# Patient Record
Sex: MALE | Race: WHITE | NOT HISPANIC OR LATINO | Employment: OTHER | URBAN - NONMETROPOLITAN AREA
[De-identification: names, ages, dates, MRNs, and addresses within clinical notes are randomized per-mention and may not be internally consistent; named-entity substitution may affect disease eponyms.]

---

## 2022-11-10 ENCOUNTER — TELEPHONE (OUTPATIENT)
Dept: PREADMISSION TESTING | Facility: HOSPITAL | Age: 67
End: 2022-11-10

## 2022-11-11 ENCOUNTER — PRE-ADMISSION TESTING (OUTPATIENT)
Dept: PREADMISSION TESTING | Facility: HOSPITAL | Age: 67
End: 2022-11-11

## 2022-11-11 VITALS — WEIGHT: 207.4 LBS | BODY MASS INDEX: 30.72 KG/M2 | HEIGHT: 69 IN

## 2022-11-11 LAB
ANION GAP SERPL CALCULATED.3IONS-SCNC: 8.7 MMOL/L (ref 5–15)
BUN SERPL-MCNC: 14 MG/DL (ref 8–23)
BUN/CREAT SERPL: 16.7 (ref 7–25)
CALCIUM SPEC-SCNC: 9.1 MG/DL (ref 8.6–10.5)
CHLORIDE SERPL-SCNC: 97 MMOL/L (ref 98–107)
CO2 SERPL-SCNC: 30.3 MMOL/L (ref 22–29)
CREAT SERPL-MCNC: 0.84 MG/DL (ref 0.76–1.27)
DEPRECATED RDW RBC AUTO: 45.8 FL (ref 37–54)
EGFRCR SERPLBLD CKD-EPI 2021: 94.4 ML/MIN/1.73
ERYTHROCYTE [DISTWIDTH] IN BLOOD BY AUTOMATED COUNT: 13.4 % (ref 12.3–15.4)
GLUCOSE SERPL-MCNC: 132 MG/DL (ref 65–99)
HCT VFR BLD AUTO: 43.3 % (ref 37.5–51)
HGB BLD-MCNC: 15.2 G/DL (ref 13–17.7)
MCH RBC QN AUTO: 33 PG (ref 26.6–33)
MCHC RBC AUTO-ENTMCNC: 35.1 G/DL (ref 31.5–35.7)
MCV RBC AUTO: 93.9 FL (ref 79–97)
PLATELET # BLD AUTO: 347 10*3/MM3 (ref 140–450)
PMV BLD AUTO: 9.3 FL (ref 6–12)
POTASSIUM SERPL-SCNC: 4.2 MMOL/L (ref 3.5–5.2)
QT INTERVAL: 402 MS
QTC INTERVAL: 427 MS
RBC # BLD AUTO: 4.61 10*6/MM3 (ref 4.14–5.8)
SODIUM SERPL-SCNC: 136 MMOL/L (ref 136–145)
WBC NRBC COR # BLD: 7.99 10*3/MM3 (ref 3.4–10.8)

## 2022-11-11 PROCEDURE — 80048 BASIC METABOLIC PNL TOTAL CA: CPT

## 2022-11-11 PROCEDURE — 36415 COLL VENOUS BLD VENIPUNCTURE: CPT

## 2022-11-11 PROCEDURE — 93010 ELECTROCARDIOGRAM REPORT: CPT | Performed by: INTERNAL MEDICINE

## 2022-11-11 PROCEDURE — 85027 COMPLETE CBC AUTOMATED: CPT

## 2022-11-11 PROCEDURE — 93005 ELECTROCARDIOGRAM TRACING: CPT

## 2022-11-11 RX ORDER — AMLODIPINE BESYLATE 10 MG/1
10 TABLET ORAL DAILY
COMMUNITY

## 2022-11-11 RX ORDER — SIMVASTATIN 40 MG
40 TABLET ORAL NIGHTLY
COMMUNITY

## 2022-11-11 RX ORDER — IBUPROFEN 200 MG
400 TABLET ORAL EVERY 6 HOURS PRN
COMMUNITY

## 2022-11-11 RX ORDER — CALCIUM SENNOSIDES 25 MG/1
TABLET ORAL DAILY
COMMUNITY

## 2022-11-11 RX ORDER — AMLODIPINE BESYLATE 2.5 MG/1
2.5 TABLET ORAL DAILY
COMMUNITY

## 2022-11-11 RX ORDER — MULTIPLE VITAMINS W/ MINERALS TAB 9MG-400MCG
1 TAB ORAL DAILY
COMMUNITY

## 2022-11-11 RX ORDER — DOCUSATE SODIUM 100 MG/1
300 CAPSULE, LIQUID FILLED ORAL DAILY
COMMUNITY

## 2022-11-11 RX ORDER — DULOXETIN HYDROCHLORIDE 60 MG/1
60 CAPSULE, DELAYED RELEASE ORAL DAILY
COMMUNITY

## 2022-11-11 RX ORDER — OXYCODONE AND ACETAMINOPHEN 7.5; 325 MG/1; MG/1
1 TABLET ORAL EVERY 6 HOURS PRN
COMMUNITY

## 2022-11-11 RX ORDER — LORATADINE 10 MG/1
10 TABLET ORAL DAILY
COMMUNITY

## 2022-11-11 RX ORDER — CHLORTHALIDONE 25 MG/1
25 TABLET ORAL DAILY
COMMUNITY

## 2022-11-11 RX ORDER — CYCLOBENZAPRINE HCL 10 MG
10 TABLET ORAL 2 TIMES DAILY PRN
COMMUNITY

## 2022-11-11 RX ORDER — DOXEPIN HYDROCHLORIDE 25 MG/1
25 CAPSULE ORAL NIGHTLY
COMMUNITY

## 2022-11-11 RX ORDER — LISINOPRIL 40 MG/1
40 TABLET ORAL DAILY
COMMUNITY

## 2022-11-11 NOTE — DISCHARGE INSTRUCTIONS
PAT PASS GIVEN/REVIEWED WITH PT.  VERBALIZED UNDERSTANDING OF THE FOLLOWING:  DO NOT EAT, DRINK, SMOKE, USE SMOKELESS TOBACCO OR CHEW GUM AFTER MIDNIGHT THE NIGHT BEFORE SURGERY.  THIS ALSO INCLUDES HARD CANDIES AND MINTS.    DO NOT SHAVE THE AREA TO BE OPERATED ON AT LEAST 48 HOURS PRIOR TO THE PROCEDURE.  DO NOT WEAR MAKE UP OR NAIL POLISH.  DO NOT LEAVE IN ANY PIERCING OR WEAR JEWELRY THE DAY OF SURGERY.      DO NOT USE ADHESIVES IF YOU WEAR DENTURES.    DO NOT WEAR EYE CONTACTS; BRING IN YOUR GLASSES.    ONLY TAKE MEDICATION THE MORNING OF YOUR PROCEDURE IF INSTRUCTED BY YOUR SURGEON WITH ENOUGH WATER TO SWALLOW THE MEDICATION.  IF YOUR SURGEON DID NOT SPECIFY WHICH MEDICATIONS TO TAKE, YOU WILL NEED TO CALL THEIR OFFICE FOR FURTHER INSTRUCTIONS AND DO AS THEY INSTRUCT.    LEAVE ANYTHING YOU CONSIDER VALUABLE AT HOME.    YOU WILL NEED TO ARRANGE FOR SOMEONE TO DRIVE YOU HOME AFTER SURGERY.  IT IS RECOMMENDED THAT YOU DO NOT DRIVE, WORK, DRINK ALCOHOL OR MAKE MAJOR DECISIONS FOR AT LEAST 24 HOURS AFTER YOUR PROCEDURE IS COMPLETE.      THE DAY OF YOUR PROCEDURE, BRING IN THE FOLLOWING IF APPLICABLE:   PICTURE ID AND INSURANCE/MEDICARE OR MEDICAID CARDS/ANY CO-PAY THAT MAY BE DUE   COPY OF ADVANCED DIRECTIVE/LIVING WILL/POWER OR    CPAP/BIPAP/INHALERS   SKIN PREP SHEET   YOUR PREADMISSION TESTING PASS (IF NOT A PHONE HISTORY)      Chlorhexadine wipes along with instruction/verification sheet given to pt.  Instructed pt to date, time, and initial the verification sheet once skin prep has been  completed, and to return to [facility] the day of the procedure.  Pt. Verbalizes understanding.      Introduction to anesthesia video viewed by pt in PAT.

## 2022-11-18 ENCOUNTER — ANESTHESIA EVENT (OUTPATIENT)
Dept: PERIOP | Facility: HOSPITAL | Age: 67
End: 2022-11-18

## 2022-11-18 ENCOUNTER — ANESTHESIA (OUTPATIENT)
Dept: PERIOP | Facility: HOSPITAL | Age: 67
End: 2022-11-18

## 2022-11-18 ENCOUNTER — HOSPITAL ENCOUNTER (OUTPATIENT)
Facility: HOSPITAL | Age: 67
Setting detail: HOSPITAL OUTPATIENT SURGERY
Discharge: HOME | End: 2022-11-18
Attending: ORTHOPAEDIC SURGERY | Admitting: ORTHOPAEDIC SURGERY

## 2022-11-18 VITALS
OXYGEN SATURATION: 93 % | SYSTOLIC BLOOD PRESSURE: 135 MMHG | HEART RATE: 70 BPM | RESPIRATION RATE: 18 BRPM | DIASTOLIC BLOOD PRESSURE: 83 MMHG | TEMPERATURE: 97.7 F

## 2022-11-18 PROCEDURE — 0 CEFAZOLIN SODIUM-DEXTROSE 2-3 GM-%(50ML) RECONSTITUTED SOLUTION: Performed by: ORTHOPAEDIC SURGERY

## 2022-11-18 PROCEDURE — 0 LIDOCAINE 1 % SOLUTION 20 ML VIAL: Performed by: ORTHOPAEDIC SURGERY

## 2022-11-18 PROCEDURE — 25010000002 HYDROMORPHONE PER 4 MG: Performed by: NURSE ANESTHETIST, CERTIFIED REGISTERED

## 2022-11-18 PROCEDURE — 25010000002 DEXAMETHASONE PER 1 MG: Performed by: NURSE ANESTHETIST, CERTIFIED REGISTERED

## 2022-11-18 PROCEDURE — 25010000002 ONDANSETRON PER 1 MG: Performed by: NURSE ANESTHETIST, CERTIFIED REGISTERED

## 2022-11-18 PROCEDURE — 25010000002 PROPOFOL 200 MG/20ML EMULSION: Performed by: NURSE ANESTHETIST, CERTIFIED REGISTERED

## 2022-11-18 DEVICE — DEV CONTRL TISS STRATAFIX SPIRAL MNCRYL UD 3/0 PLS 60CM: Type: IMPLANTABLE DEVICE | Site: HAND | Status: FUNCTIONAL

## 2022-11-18 RX ORDER — OXYCODONE HYDROCHLORIDE AND ACETAMINOPHEN 5; 325 MG/1; MG/1
1 TABLET ORAL EVERY 4 HOURS PRN
Qty: 30 TABLET | Refills: 0 | Status: SHIPPED | OUTPATIENT
Start: 2022-11-18

## 2022-11-18 RX ORDER — PROPOFOL 10 MG/ML
INJECTION, EMULSION INTRAVENOUS AS NEEDED
Status: DISCONTINUED | OUTPATIENT
Start: 2022-11-18 | End: 2022-11-18 | Stop reason: SURG

## 2022-11-18 RX ORDER — LIDOCAINE HYDROCHLORIDE 20 MG/ML
INJECTION, SOLUTION INTRAVENOUS AS NEEDED
Status: DISCONTINUED | OUTPATIENT
Start: 2022-11-18 | End: 2022-11-18 | Stop reason: SURG

## 2022-11-18 RX ORDER — DEXAMETHASONE SODIUM PHOSPHATE 4 MG/ML
INJECTION, SOLUTION INTRA-ARTICULAR; INTRALESIONAL; INTRAMUSCULAR; INTRAVENOUS; SOFT TISSUE AS NEEDED
Status: DISCONTINUED | OUTPATIENT
Start: 2022-11-18 | End: 2022-11-18 | Stop reason: SURG

## 2022-11-18 RX ORDER — ONDANSETRON 2 MG/ML
INJECTION INTRAMUSCULAR; INTRAVENOUS AS NEEDED
Status: DISCONTINUED | OUTPATIENT
Start: 2022-11-18 | End: 2022-11-18 | Stop reason: SURG

## 2022-11-18 RX ORDER — SODIUM CHLORIDE, SODIUM LACTATE, POTASSIUM CHLORIDE, CALCIUM CHLORIDE 600; 310; 30; 20 MG/100ML; MG/100ML; MG/100ML; MG/100ML
1000 INJECTION, SOLUTION INTRAVENOUS CONTINUOUS
Status: DISCONTINUED | OUTPATIENT
Start: 2022-11-18 | End: 2022-11-18 | Stop reason: HOSPADM

## 2022-11-18 RX ORDER — CEFAZOLIN SODIUM 2 G/50ML
2 SOLUTION INTRAVENOUS ONCE
Status: COMPLETED | OUTPATIENT
Start: 2022-11-18 | End: 2022-11-18

## 2022-11-18 RX ORDER — HYDROMORPHONE HCL 110MG/55ML
PATIENT CONTROLLED ANALGESIA SYRINGE INTRAVENOUS AS NEEDED
Status: DISCONTINUED | OUTPATIENT
Start: 2022-11-18 | End: 2022-11-18 | Stop reason: SURG

## 2022-11-18 RX ADMIN — CEFAZOLIN SODIUM 2 G: 2 SOLUTION INTRAVENOUS at 10:33

## 2022-11-18 RX ADMIN — PROPOFOL 200 MG: 10 INJECTION, EMULSION INTRAVENOUS at 10:36

## 2022-11-18 RX ADMIN — HYDROMORPHONE HYDROCHLORIDE 0.5 MG: 2 INJECTION, SOLUTION INTRAMUSCULAR; INTRAVENOUS; SUBCUTANEOUS at 10:43

## 2022-11-18 RX ADMIN — DEXAMETHASONE SODIUM PHOSPHATE 4 MG: 4 INJECTION, SOLUTION INTRAMUSCULAR; INTRAVENOUS at 10:36

## 2022-11-18 RX ADMIN — GLYCOPYRROLATE 0.4 MG: 0.2 INJECTION, SOLUTION INTRAMUSCULAR; INTRAVENOUS at 10:52

## 2022-11-18 RX ADMIN — SODIUM CHLORIDE, POTASSIUM CHLORIDE, SODIUM LACTATE AND CALCIUM CHLORIDE 1000 ML: 600; 310; 30; 20 INJECTION, SOLUTION INTRAVENOUS at 09:27

## 2022-11-18 RX ADMIN — LIDOCAINE HYDROCHLORIDE 40 MG: 20 INJECTION, SOLUTION INTRAVENOUS at 10:36

## 2022-11-18 RX ADMIN — HYDROMORPHONE HYDROCHLORIDE 1 MG: 2 INJECTION, SOLUTION INTRAMUSCULAR; INTRAVENOUS; SUBCUTANEOUS at 10:49

## 2022-11-18 RX ADMIN — ONDANSETRON 4 MG: 2 INJECTION INTRAMUSCULAR; INTRAVENOUS at 10:36

## 2022-11-18 RX ADMIN — HYDROMORPHONE HYDROCHLORIDE 0.5 MG: 2 INJECTION, SOLUTION INTRAMUSCULAR; INTRAVENOUS; SUBCUTANEOUS at 10:46

## 2022-11-18 NOTE — OP NOTE
[address], [address]  [place]  [address] [phone]      OPERATIVE REPORT           PATIENT NAME:   Harley Flynn                            YOB: 1955      PREOP DIAGNOSIS:   Right Carpal Tunnel Syndrome and cubital tunnel syndrome    POSTOP DIAGNOSIS:  Right Carpal Tunnel Syndrome and cubital tunnel syndrome  PROCEDURE:     Right Carpal Tunnel Release and cubital tunnel release    SURGEON:    Laz Cristina MD    OPERATIVE TEAM:   Circulator: Arsen Rodarte RN  Scrub Person: Yoly Tang Misty    ANESTHETIST:   LISS: Tim Ortiz CRNA    ANESTHESIA:   General    ESTIM BLOOD LOSS:  minimal      SPECIMENS:     None.    DRAINS:    None.    COMPLICATIONS:     None.    DISPOSITION:     Stable to recovery.    INDICATIONS AND NARRATIVE:   The patient presents for planned elective carpal tunnel and cubital tunnel release surgery to address the ongoing Right  wrist and hand and elbow condition.   The risks and benefits of the surgery were discussed and an informed consent obtained.  Risks were discussed including, but not limited to anesthesia, infection, nerve/vessel/tendon injury and persistent symptoms or limitations of the wrist and hand.  Goals include pain relief, improved sensation, strength, mobility and potential for improved function of the wrist and hand.      OPERATIVE PROCEDURE:  Antibiotic prophylaxis given.  Surgeon site marking and a time out were performed.  Anesthesia was effective and well tolerated.  The Right arm and hand was prepped and draped in the usual sterile fashion.      After sterile prep and drape and with tourniquet, a longitudinal incision made proximal palm   sharp dissection was carried down through superficial palmar fascia.  This was continued to the transverse carpal ligament.  Complete release was noted distally.   Sharp to dull resection utilized proximally, noting complete release. decompressing the median  nerve and flexor tendons.  These structures could be easily visualized, and were intact within the carpal tunnel.  Median nerve had hour glass shape, no other pathology was noted.  The tourniquet was taken down and there was excellent hemostasis during the procedure.  The wound was irrigated copiously.  Skin closure consisted of interrupted 4-0 nylon  sutures.      a  curved incision was made and centered posterior to the medial epicondlye. Branches of the medial antebrachial cutaneous nerve were identified and preserved during the subcutaneous dissection. The fascia over the flexor carpi ulnaris and Delmi’s ligament were released exposing the ulnar nerve posterior to the medial epicondlye within the cubital tunnel. The nerve was release distally for four centimeters and proximally as well for six to eight centimeters along the medial intermuscular septum and releasing the [place] proximally. The nerve could now stay in a relaxed position in situ throughout full elbow extension, flexion, pronation and supination. It was relaxed without tension through the full range of elbow and arm motion.      The tourniquet was taken down and there was good hemostasis.  Minor bleeding was controlled with electocautery and the wound was irrigated copiously throughout the procedure with antibiotic solution. Skin closure consisted of interrupted 2-0 Vicryl subcutaneous sutures and 3-0 nylon sutures. A sterile dressing and a sling was applied. Anesthesia was effective and well tolerated. There were no complications of the procedure. The patient was transferred in stable condition to the recovery room.      Caleb Cristina MD  11/18/2022

## 2022-11-18 NOTE — ANESTHESIA POSTPROCEDURE EVALUATION
Patient: Harley Flynn    Procedure Summary     Date: 11/18/22 Room / Location: [facility] OR  /  [facility] OR    Anesthesia Start: 1033 Anesthesia Stop: 1116    Procedure: RIGHT CARPAL TUNNEL RELEASE AND RIGHT CUBITAL TUNNEL RELEASE (Right: Wrist) Diagnosis:       Carpal tunnel syndrome, right upper limb      Lesion of ulnar nerve, right upper limb      (Carpal tunnel syndrome, right upper limb [G56.01])      (Lesion of ulnar nerve, right upper limb [G56.21])    Surgeons: Caleb Cristina MD Provider: Tim Ortiz CRNA    Anesthesia Type: MAC ASA Status: 3          Anesthesia Type: MAC    Vitals  HR 62  Sat 98  Resp 12  Temp 98.3  /67        Post Anesthesia Care and Evaluation    Patient location during evaluation: PACU  Patient participation: complete - patient participated  Level of consciousness: awake and alert  Pain management: satisfactory to patient    Airway patency: patent  Anesthetic complications: No anesthetic complications    Cardiovascular status: acceptable and stable  Respiratory status: acceptable and face mask  Hydration status: acceptable

## 2022-11-18 NOTE — ANESTHESIA PROCEDURE NOTES
Airway  Urgency: elective    Date/Time: 11/18/2022 10:37 AM  Airway not difficult    General Information and Staff    Patient location during procedure: OR  CRNA/CAA: Tim Ortiz, ILSS    Indications and Patient Condition  Indications for airway management: airway protection    Preoxygenated: yes  Mask difficulty assessment: 1 - vent by mask    Final Airway Details  Final airway type: supraglottic airway      Successful airway: classic  Size 4     Number of attempts at approach: 1  Assessment: lips, teeth, and gum same as pre-op and atraumatic intubation    Additional Comments  Cuff pressure/leak less than 74mgE16

## 2022-11-18 NOTE — ANESTHESIA PREPROCEDURE EVALUATION
Anesthesia Evaluation     Patient summary reviewed and Nursing notes reviewed   history of anesthetic complications: PONV  NPO Solid Status: > 8 hours  NPO Liquid Status: > 8 hours           Airway   Mallampati: II  TM distance: >3 FB  Neck ROM: full  Possible difficult intubation  Dental - normal exam     Pulmonary - normal exam   (+) sleep apnea,   Cardiovascular - normal exam  Exercise tolerance: good (4-7 METS)    ECG reviewed    (+) hypertension 2 medications or greater, hyperlipidemia,     ROS comment: Sinus rhythm with premature atrial complexes  Left axis deviation  Left ventricular hypertrophy with QRS widening  Abnormal ECG  When compared with ECG of 11-NOV-2022 11:17, (Unconfirmed)  No significant change was found  Confirmed by IKER HDEZ ([id number]) on 11/11/2022 1:32:55 PM    Neuro/Psych  (+) psychiatric history,    GI/Hepatic/Renal/Endo    (+) obesity,       Musculoskeletal     Abdominal    Substance History - negative use     OB/GYN negative ob/gyn ROS         Other   arthritis,                      Anesthesia Plan    ASA 3     MAC     (Risks and benefits discussed including risk of aspiration, recall and dental damage. All patient questions answered.    Will continue with plan of care.)  intravenous induction     Anesthetic plan, risks, benefits, and alternatives have been provided, discussed and informed consent has been obtained with: patient.  Pre-procedure education provided  Plan discussed with CRNA.        CODE STATUS:

## 2023-01-07 ENCOUNTER — APPOINTMENT (OUTPATIENT)
Dept: CT IMAGING | Facility: HOSPITAL | Age: 68
End: 2023-01-07
Payer: MEDICARE

## 2023-01-07 ENCOUNTER — APPOINTMENT (OUTPATIENT)
Dept: GENERAL RADIOLOGY | Facility: HOSPITAL | Age: 68
End: 2023-01-07
Payer: MEDICARE

## 2023-01-07 ENCOUNTER — HOSPITAL ENCOUNTER (EMERGENCY)
Facility: HOSPITAL | Age: 68
Discharge: HOME | End: 2023-01-07
Attending: EMERGENCY MEDICINE | Admitting: EMERGENCY MEDICINE
Payer: MEDICARE

## 2023-01-07 VITALS
HEIGHT: 68 IN | OXYGEN SATURATION: 96 % | TEMPERATURE: 98.3 F | WEIGHT: 217 LBS | DIASTOLIC BLOOD PRESSURE: 95 MMHG | HEART RATE: 69 BPM | BODY MASS INDEX: 32.89 KG/M2 | SYSTOLIC BLOOD PRESSURE: 135 MMHG | RESPIRATION RATE: 20 BRPM

## 2023-01-07 DIAGNOSIS — R06.02 SHORTNESS OF BREATH: Primary | ICD-10-CM

## 2023-01-07 LAB
ALBUMIN SERPL-MCNC: 4.4 G/DL (ref 3.5–5.2)
ALBUMIN/GLOB SERPL: 1.3 G/DL
ALP SERPL-CCNC: 102 U/L (ref 39–117)
ALT SERPL W P-5'-P-CCNC: 13 U/L (ref 1–41)
ANION GAP SERPL CALCULATED.3IONS-SCNC: 12.2 MMOL/L (ref 5–15)
AST SERPL-CCNC: 18 U/L (ref 1–40)
BASOPHILS # BLD AUTO: 0.03 10*3/MM3 (ref 0–0.2)
BASOPHILS NFR BLD AUTO: 0.3 % (ref 0–1.5)
BILIRUB SERPL-MCNC: 0.5 MG/DL (ref 0–1.2)
BUN SERPL-MCNC: 14 MG/DL (ref 8–23)
BUN/CREAT SERPL: 17.3 (ref 7–25)
CALCIUM SPEC-SCNC: 9.8 MG/DL (ref 8.6–10.5)
CHLORIDE SERPL-SCNC: 92 MMOL/L (ref 98–107)
CO2 SERPL-SCNC: 30.8 MMOL/L (ref 22–29)
CREAT SERPL-MCNC: 0.81 MG/DL (ref 0.76–1.27)
DEPRECATED RDW RBC AUTO: 43.8 FL (ref 37–54)
EGFRCR SERPLBLD CKD-EPI 2021: 95.4 ML/MIN/1.73
EOSINOPHIL # BLD AUTO: 0.01 10*3/MM3 (ref 0–0.4)
EOSINOPHIL NFR BLD AUTO: 0.1 % (ref 0.3–6.2)
ERYTHROCYTE [DISTWIDTH] IN BLOOD BY AUTOMATED COUNT: 12.8 % (ref 12.3–15.4)
FLUAV RNA RESP QL NAA+PROBE: NOT DETECTED
FLUBV RNA RESP QL NAA+PROBE: NOT DETECTED
GLOBULIN UR ELPH-MCNC: 3.4 GM/DL
GLUCOSE SERPL-MCNC: 142 MG/DL (ref 65–99)
HCT VFR BLD AUTO: 43.5 % (ref 37.5–51)
HGB BLD-MCNC: 15 G/DL (ref 13–17.7)
HOLD SPECIMEN: NORMAL
HOLD SPECIMEN: NORMAL
IMM GRANULOCYTES # BLD AUTO: 0.03 10*3/MM3 (ref 0–0.05)
IMM GRANULOCYTES NFR BLD AUTO: 0.3 % (ref 0–0.5)
LYMPHOCYTES # BLD AUTO: 2.36 10*3/MM3 (ref 0.7–3.1)
LYMPHOCYTES NFR BLD AUTO: 23 % (ref 19.6–45.3)
MCH RBC QN AUTO: 32 PG (ref 26.6–33)
MCHC RBC AUTO-ENTMCNC: 34.5 G/DL (ref 31.5–35.7)
MCV RBC AUTO: 92.8 FL (ref 79–97)
MONOCYTES # BLD AUTO: 0.8 10*3/MM3 (ref 0.1–0.9)
MONOCYTES NFR BLD AUTO: 7.8 % (ref 5–12)
NEUTROPHILS NFR BLD AUTO: 68.5 % (ref 42.7–76)
NEUTROPHILS NFR BLD AUTO: 7.05 10*3/MM3 (ref 1.7–7)
NRBC BLD AUTO-RTO: 0 /100 WBC (ref 0–0.2)
NT-PROBNP SERPL-MCNC: 89.7 PG/ML (ref 0–900)
PLATELET # BLD AUTO: 508 10*3/MM3 (ref 140–450)
PMV BLD AUTO: 8.7 FL (ref 6–12)
POTASSIUM SERPL-SCNC: 3.3 MMOL/L (ref 3.5–5.2)
PROT SERPL-MCNC: 7.8 G/DL (ref 6–8.5)
RBC # BLD AUTO: 4.69 10*6/MM3 (ref 4.14–5.8)
SARS-COV-2 RNA RESP QL NAA+PROBE: NOT DETECTED
SODIUM SERPL-SCNC: 135 MMOL/L (ref 136–145)
TROPONIN T SERPL-MCNC: <0.01 NG/ML (ref 0–0.03)
WBC NRBC COR # BLD: 10.28 10*3/MM3 (ref 3.4–10.8)
WHOLE BLOOD HOLD COAG: NORMAL
WHOLE BLOOD HOLD SPECIMEN: NORMAL

## 2023-01-07 PROCEDURE — 87636 SARSCOV2 & INF A&B AMP PRB: CPT | Performed by: EMERGENCY MEDICINE

## 2023-01-07 PROCEDURE — 25010000002 IOPAMIDOL 61 % SOLUTION: Performed by: EMERGENCY MEDICINE

## 2023-01-07 PROCEDURE — 84484 ASSAY OF TROPONIN QUANT: CPT | Performed by: EMERGENCY MEDICINE

## 2023-01-07 PROCEDURE — 71045 X-RAY EXAM CHEST 1 VIEW: CPT

## 2023-01-07 PROCEDURE — 83880 ASSAY OF NATRIURETIC PEPTIDE: CPT | Performed by: EMERGENCY MEDICINE

## 2023-01-07 PROCEDURE — 94799 UNLISTED PULMONARY SVC/PX: CPT

## 2023-01-07 PROCEDURE — 93005 ELECTROCARDIOGRAM TRACING: CPT | Performed by: EMERGENCY MEDICINE

## 2023-01-07 PROCEDURE — 85025 COMPLETE CBC W/AUTO DIFF WBC: CPT | Performed by: EMERGENCY MEDICINE

## 2023-01-07 PROCEDURE — 80053 COMPREHEN METABOLIC PANEL: CPT | Performed by: EMERGENCY MEDICINE

## 2023-01-07 PROCEDURE — 99284 EMERGENCY DEPT VISIT MOD MDM: CPT

## 2023-01-07 PROCEDURE — 36415 COLL VENOUS BLD VENIPUNCTURE: CPT

## 2023-01-07 PROCEDURE — 94640 AIRWAY INHALATION TREATMENT: CPT

## 2023-01-07 PROCEDURE — 93005 ELECTROCARDIOGRAM TRACING: CPT

## 2023-01-07 PROCEDURE — 71275 CT ANGIOGRAPHY CHEST: CPT

## 2023-01-07 RX ORDER — ACETAMINOPHEN 500 MG
1000 TABLET ORAL ONCE
Status: COMPLETED | OUTPATIENT
Start: 2023-01-07 | End: 2023-01-07

## 2023-01-07 RX ORDER — SODIUM CHLORIDE 0.9 % (FLUSH) 0.9 %
10 SYRINGE (ML) INJECTION AS NEEDED
Status: DISCONTINUED | OUTPATIENT
Start: 2023-01-07 | End: 2023-01-07 | Stop reason: HOSPADM

## 2023-01-07 RX ORDER — IPRATROPIUM BROMIDE AND ALBUTEROL SULFATE 2.5; .5 MG/3ML; MG/3ML
3 SOLUTION RESPIRATORY (INHALATION) ONCE
Status: COMPLETED | OUTPATIENT
Start: 2023-01-07 | End: 2023-01-07

## 2023-01-07 RX ADMIN — IOPAMIDOL 100 ML: 612 INJECTION, SOLUTION INTRAVENOUS at 05:40

## 2023-01-07 RX ADMIN — ACETAMINOPHEN 1000 MG: 500 TABLET ORAL at 07:59

## 2023-01-07 RX ADMIN — IPRATROPIUM BROMIDE AND ALBUTEROL SULFATE 3 ML: .5; 3 SOLUTION RESPIRATORY (INHALATION) at 05:22

## 2023-01-07 NOTE — ED PROVIDER NOTES
TRIAGE CHIEF COMPLAINT:     Nursing and triage notes reviewed    Chief Complaint   Patient presents with   • Shortness of Breath      HPI: Harley Flynn is a 67 y.o. male who presents to the emergency department complaining of shortness of breath.  Patient states he felt like he could not breathe when he lay down flat.  He states it made to have difficulty sleeping.  He said some cough.  He has had nasal congestion and a headache recently as well.  He states symptoms worsened tonight but have been present the past few days.  Denies chest pain.  Denies abdominal pain, nausea, vomiting.    REVIEW OF SYSTEMS: All other systems reviewed and are negative     PAST MEDICAL HISTORY:   Past Medical History:   Diagnosis Date   • Anxiety and depression    • Arthritis    • Chronic pain     states needs a new right knee replacement (replaced in )   • Constipation    • Elevated cholesterol    • History of nuclear stress test 2016    pt states was WNL   • Hypertension    • PONV (postoperative nausea and vomiting)    • Seasonal allergies    • Sleep apnea    • Wears glasses         FAMILY HISTORY:   History reviewed. No pertinent family history.     SOCIAL HISTORY:   Social History     Socioeconomic History   • Marital status:    Tobacco Use   • Smoking status: Former     Packs/day: 2.00     Types: Cigarettes     Quit date:      Years since quittin.0   • Smokeless tobacco: Never   • Tobacco comments:     Smoked since age 11   Vaping Use   • Vaping Use: Never used   Substance and Sexual Activity   • Alcohol use: Not Currently   • Drug use: Never   • Sexual activity: Defer        SURGICAL HISTORY:   Past Surgical History:   Procedure Laterality Date   • APPENDECTOMY     • CARPAL TUNNEL RELEASE Right 2022    Procedure: RIGHT CARPAL TUNNEL RELEASE AND RIGHT CUBITAL TUNNEL RELEASE;  Surgeon: Caleb Cristina MD;  Location: [facility];  Service: Orthopedics;  Laterality: Right;   • COLONOSCOPY     • REPLACEMENT  TOTAL KNEE Right    • WISDOM TOOTH EXTRACTION          CURRENT MEDICATIONS:      Medication List      ASK your doctor about these medications    * amLODIPine 10 MG tablet  Commonly known as: NORVASC     * amLODIPine 2.5 MG tablet  Commonly known as: NORVASC     chlorthalidone 25 MG tablet  Commonly known as: HYGROTON     cyclobenzaprine 10 MG tablet  Commonly known as: FLEXERIL     DEPO-TESTOSTERONE IM     diclofenac 50 MG EC tablet  Commonly known as: VOLTAREN     docusate sodium 100 MG capsule  Commonly known as: COLACE     doxepin 25 MG capsule  Commonly known as: SINEquan     DULoxetine 60 MG capsule  Commonly known as: CYMBALTA     fluticasone 27.5 MCG/SPRAY nasal spray  Commonly known as: VERAMYST     ibuprofen 200 MG tablet  Commonly known as: ADVIL,MOTRIN     Laxative 25 MG tablet  Generic drug: Sennosides     lisinopril 40 MG tablet  Commonly known as: PRINIVIL,ZESTRIL     loratadine 10 MG tablet  Commonly known as: CLARITIN     Melatonin 2.5 MG chewable tablet     MORPHINE SULFATE ER PO     * multivitamin with minerals tablet tablet     * EYE VITAMINS PO     * oxyCODONE-acetaminophen 7.5-325 MG per tablet  Commonly known as: PERCOCET     * oxyCODONE-acetaminophen 5-325 MG per tablet  Commonly known as: PERCOCET  Take 1 tablet by mouth Every 4 (Four) Hours As Needed for Pain     simvastatin 40 MG tablet  Commonly known as: ZOCOR         * This list has 6 medication(s) that are the same as other medications prescribed for you. Read the directions carefully, and ask your doctor or other care provider to review them with you.                 ALLERGIES: Patient has no known allergies.     PHYSICAL EXAM:   VITAL SIGNS:   Vitals:    01/07/23 0415   BP: 156/94   Pulse: 77   Resp: 18   Temp: 98.3 °F (36.8 °C)   SpO2: 94%      CONSTITUTIONAL: Awake, oriented, appears nontoxic   HENT: Atraumatic, normocephalic, oral mucosa pink and moist, airway patent. Nares patent without drainage. External ears normal.   EYES:  Conjunctivae clear   NECK: Trachea midline   CARDIOVASCULAR: Normal heart rate, Normal rhythm, No murmurs, rubs, gallops   PULMONARY/CHEST: Clear to auscultation, no rhonchi, wheezes, or rales. Symmetrical breath sounds.  ABDOMINAL: Nondistended, soft, nontender - no rebound or guarding.  NEUROLOGIC: Nonfocal, moving all four extremities, no gross sensory or motor deficits.   EXTREMITIES: No clubbing, cyanosis, or edema   SKIN: Warm, Dry, No erythema, No rash     ED COURSE / MEDICAL DECISION MAKING:   Harley Flynn is a 67 y.o. male who presents to the emergency department for evaluation of shortness of breath.  Patient is nondistressed on arrival in the emergency department.  Patient is breathing comfortably on room air on arrival in the emergency department.  Vital signs are stable.  Exam is largely unremarkable.    Differential diagnosis includes viral illness, ACS, pulmonary edema, COPD among other etiologies.    Chest x-ray, EKG, CBC, CMP, cardiac enzymes, BNP was ordered for further evaluation of the patient's presentation.    Chart review including any outside testing was reviewed including previous EKG.    Patient was treated with a breathing treatment on arrival.    EKG interpreted by me reveals sinus rhythm with rate of 75 bpm.  There is sinus arrhythmia.  There is a left anterior fascicular block.  This is an abnormal appearing EKG.    Work appears overall reassuring.  Patient resting comfortably.    Plan for disposition is home with close outpatient follow-up.    DECISION TO DISCHARGE/ADMIT: see ED care timeline     FINAL IMPRESSION:   1 --shortness of breath  2 --   3 --     Electronically signed by: Alison Cardona MD, 1/7/2023 05:05 Arsen Urias MD  01/07/23 0819

## 2023-05-12 ENCOUNTER — OFFICE VISIT (OUTPATIENT)
Dept: INTERNAL MEDICINE | Facility: CLINIC | Age: 68
End: 2023-05-12
Payer: MEDICARE

## 2023-05-12 VITALS
DIASTOLIC BLOOD PRESSURE: 64 MMHG | WEIGHT: 215.75 LBS | TEMPERATURE: 98.2 F | BODY MASS INDEX: 30.89 KG/M2 | SYSTOLIC BLOOD PRESSURE: 110 MMHG | HEART RATE: 57 BPM | HEIGHT: 70 IN | OXYGEN SATURATION: 98 %

## 2023-05-12 DIAGNOSIS — J30.89 ENVIRONMENTAL AND SEASONAL ALLERGIES: ICD-10-CM

## 2023-05-12 DIAGNOSIS — R79.89 LOW TESTOSTERONE: ICD-10-CM

## 2023-05-12 DIAGNOSIS — F32.A ANXIETY AND DEPRESSION: ICD-10-CM

## 2023-05-12 DIAGNOSIS — I69.320 APHASIA AS LATE EFFECT OF CEREBROVASCULAR ACCIDENT (CVA): ICD-10-CM

## 2023-05-12 DIAGNOSIS — F51.01 PRIMARY INSOMNIA: ICD-10-CM

## 2023-05-12 DIAGNOSIS — R73.9 HYPERGLYCEMIA: ICD-10-CM

## 2023-05-12 DIAGNOSIS — I48.0 PAROXYSMAL A-FIB: ICD-10-CM

## 2023-05-12 DIAGNOSIS — G47.33 OSA (OBSTRUCTIVE SLEEP APNEA): ICD-10-CM

## 2023-05-12 DIAGNOSIS — I63.9 CEREBROVASCULAR ACCIDENT (CVA), UNSPECIFIED MECHANISM: ICD-10-CM

## 2023-05-12 DIAGNOSIS — E78.5 HYPERLIPIDEMIA, UNSPECIFIED HYPERLIPIDEMIA TYPE: ICD-10-CM

## 2023-05-12 DIAGNOSIS — F41.9 ANXIETY AND DEPRESSION: ICD-10-CM

## 2023-05-12 DIAGNOSIS — I10 PRIMARY HYPERTENSION: ICD-10-CM

## 2023-05-12 DIAGNOSIS — Z76.89 ENCOUNTER TO ESTABLISH CARE: Primary | ICD-10-CM

## 2023-05-12 RX ORDER — GABAPENTIN 100 MG/1
CAPSULE ORAL
COMMUNITY
Start: 2023-04-12 | End: 2023-05-12

## 2023-05-12 RX ORDER — IBUPROFEN 200 MG
200 TABLET ORAL EVERY 6 HOURS PRN
COMMUNITY

## 2023-05-12 RX ORDER — BROMPHENIRAMINE MALEATE, PSEUDOEPHEDRINE HYDROCHLORIDE, AND DEXTROMETHORPHAN HYDROBROMIDE 2; 30; 10 MG/5ML; MG/5ML; MG/5ML
SYRUP ORAL
COMMUNITY
Start: 2023-02-09 | End: 2023-05-12

## 2023-05-12 RX ORDER — HYDROXYZINE PAMOATE 50 MG/1
CAPSULE ORAL
COMMUNITY
Start: 2023-04-04 | End: 2023-05-12

## 2023-05-12 RX ORDER — FLUTICASONE PROPIONATE 50 MCG
SPRAY, SUSPENSION (ML) NASAL
COMMUNITY
Start: 2023-03-13

## 2023-05-12 RX ORDER — TAMSULOSIN HYDROCHLORIDE 0.4 MG/1
CAPSULE ORAL
COMMUNITY
Start: 2023-04-13 | End: 2023-05-12

## 2023-05-12 RX ORDER — ARIPIPRAZOLE 5 MG/1
TABLET ORAL
COMMUNITY
Start: 2023-05-04

## 2023-05-12 RX ORDER — TRAZODONE HYDROCHLORIDE 100 MG/1
TABLET ORAL
COMMUNITY
Start: 2023-05-02

## 2023-05-12 RX ORDER — ENOXAPARIN SODIUM 100 MG/ML
INJECTION SUBCUTANEOUS
COMMUNITY
Start: 2023-04-19 | End: 2023-05-12

## 2023-05-12 RX ORDER — METOPROLOL SUCCINATE 25 MG/1
TABLET, EXTENDED RELEASE ORAL
COMMUNITY
Start: 2023-01-01

## 2023-05-12 RX ORDER — TESTOSTERONE CYPIONATE 200 MG/ML
INJECTION, SOLUTION INTRAMUSCULAR
COMMUNITY
Start: 2023-02-21

## 2023-05-12 RX ORDER — ONDANSETRON 4 MG/1
TABLET, ORALLY DISINTEGRATING ORAL
COMMUNITY
Start: 2023-04-12 | End: 2023-05-12

## 2023-05-12 RX ORDER — HYDROXYZINE PAMOATE 100 MG/1
CAPSULE ORAL
COMMUNITY
Start: 2023-04-07

## 2023-05-12 RX ORDER — MELOXICAM 15 MG/1
TABLET ORAL
COMMUNITY
Start: 2023-04-12 | End: 2023-05-12

## 2023-05-12 RX ORDER — OMEPRAZOLE 20 MG/1
CAPSULE, DELAYED RELEASE ORAL
COMMUNITY
Start: 2023-04-12 | End: 2023-05-12

## 2023-05-12 RX ORDER — TRAMADOL HYDROCHLORIDE 50 MG/1
TABLET ORAL
COMMUNITY
Start: 2023-04-12 | End: 2023-05-12

## 2023-05-12 RX ORDER — ATORVASTATIN CALCIUM 80 MG/1
80 TABLET, FILM COATED ORAL NIGHTLY
Qty: 90 TABLET | Refills: 1 | Status: SHIPPED | OUTPATIENT
Start: 2023-05-12

## 2023-05-12 RX ORDER — ACETAMINOPHEN 500 MG
TABLET ORAL
COMMUNITY
Start: 2023-04-08

## 2023-05-12 RX ORDER — CEPHALEXIN 500 MG/1
CAPSULE ORAL
COMMUNITY
Start: 2023-04-12 | End: 2023-05-12

## 2023-05-12 RX ORDER — OXYCODONE HYDROCHLORIDE 5 MG/1
TABLET ORAL
COMMUNITY
Start: 2023-04-12 | End: 2023-05-12

## 2023-05-12 RX ORDER — ATORVASTATIN CALCIUM 80 MG/1
TABLET, FILM COATED ORAL
COMMUNITY
Start: 2023-04-19 | End: 2023-05-12 | Stop reason: SDUPTHER

## 2023-05-12 RX ORDER — ASPIRIN 81 MG/1
81 TABLET ORAL DAILY
COMMUNITY

## 2023-05-12 NOTE — PROGRESS NOTES
Chief Complaint   Patient presents with   • Establish Care     Patient here as new patient today     Subjective       HPI:  Harley Flynn presents to [facility] care.  Hospitalized for a cerebrovascular accident 1 month ago at [facility]. He is following up with  neurology, and has an appointment on 07/06/2023 with Dr. Garrett Mercer. He has an upcoming transitional care appointment with Chelsi Jarquin PA-C. He has had fatigue and dizziness intermittently. It is most noticeable with position changes. Beta blocker was initiated and his BP is lower than his typical, pulse is 57. He is not drinking enough water. The patient attends physical therapy 3 times weekly and speech therapy since CVA, and progressing well. He has issues processing thoughts and answering questions, but feels that he is improving. He has had visual issues since CVA and wears glasses.    He follows orthopedics, with Dr. Maikel Zuniga. The patient has a history of neuromuscular disorder, RSD, since 1999. He reports that it resolved itself earlier this year, 2023. He used to take opiates, but is no longer. He had carpal tunnel release in 2022 on the right by Dr. Caleb Cristina and knee arthroplasty revision of a 1999 arthroplasty 1 month ago, which has been considered to be the cause of DVT/CVA. . Uses a cane for ambulation.     He has a history of hypertension, atrial fibrillation, and hyperlipidemia. He has an appointment to establish care with cardiology, KAILASH Davila, on 05/31/2023. He was supposed to start Eliquis and has the prescription, but is waiting for the results of a repeat CT scan before starting the medication per neurology.     The patient's anxiety is treated by MIRACLE Man [facility]. She referred him to a sleep medicine specialist for severe sleep apnea. He is not currently using a machine because he had issues with his previous one.     He receives supplemental testosterone injections for  low testosterone, which was previously prescribed by his primary care physician. He has experienced fatigue since his injections were discontinued after CVA. He is not sure if he will be told to continue the injections and he is waiting for his upcoming appointments to discuss this further with cardiology and neurology.    The patient reports sinus issues for which he takes Flonase Nasal Spray nightly.  He administered the spray directly up his nose and not at and angle.    He needs refills on atorvastatin. He had laboratory testing 1 month ago through his primary care physician that are not available to review. His previous primary care physician was Dr. Sreekanth Prieto at [facility] and records will be requested.     He reports colonoscopy in 2022. He is a former smoker, from 1966 to 1991, and smoking cessation occurred in 1991. He has 50 pack year history and has never used smokeless tobacco. He does not currently vape, consume alcohol, or use drugs. Unsure of LDCT or AAA screening, vaccines, will review records when obtained.     History:    Past Medical History:   Diagnosis Date   • Anxiety and depression    • Arthritis    • Chronic pain     states needs a new right knee replacement (replaced in 1999)   • Constipation    • Deep vein thrombosis 04/16/2023    Stroke from blood clot   • Elevated cholesterol    • Erectile dysfunction 01/01/2017   • History of nuclear stress test 2016    pt states was WNL   • Hypertension    • Neuromuscular disorder 12/01/1999    RSD since 1999.    Resolved itself earlier 2023   • PONV (postoperative nausea and vomiting)    • Seasonal allergies    • Sleep apnea    • Stroke 04/15/2023   • Visual impairment 04/15/2023    Blurry & right peripheral   • Wears glasses        Past Surgical History:   Procedure Laterality Date   • APPENDECTOMY     • CARPAL TUNNEL RELEASE Right 11/18/2022    Procedure: RIGHT CARPAL TUNNEL RELEASE AND RIGHT CUBITAL TUNNEL RELEASE;  Surgeon: Caleb Cristina,  MD;  Location: [facility];  Service: Orthopedics;  Laterality: Right;   • COLONOSCOPY     • JOINT REPLACEMENT  3023    Revision of 1999 replacement   • REPLACEMENT TOTAL KNEE Right    • WISDOM TOOTH EXTRACTION         Family History   Problem Relation Age of Onset   • Diabetes Mother    • Hypertension Mother    • Mental illness Mother    • Hyperlipidemia Father        Social History     Socioeconomic History   • Marital status:    Tobacco Use   • Smoking status: Former     Packs/day: 2.00     Years: 25.00     Pack years: 50.00     Types: Cigarettes     Start date: 1966     Quit date: 1991     Years since quittin.3   • Smokeless tobacco: Never   • Tobacco comments:     Smoked since age 11   Vaping Use   • Vaping Use: Never used   Substance and Sexual Activity   • Alcohol use: Not Currently     Comment: Nothing for 30+ years.   Prior, casual drinking   • Drug use: Never   • Sexual activity: Not Currently     Partners: Female     Birth control/protection: Abstinence     Comment: Only partner is wife of 41 yes but i have ED now       No Known Allergies      Current Outpatient Medications:   •  acetaminophen (TYLENOL) 500 MG tablet, , Disp: , Rfl:   •  amLODIPine (NORVASC) 10 MG tablet, Take 1 tablet by mouth Daily., Disp: , Rfl:   •  amLODIPine (NORVASC) 2.5 MG tablet, Take 1 tablet by mouth Daily., Disp: , Rfl:   •  apixaban (ELIQUIS) 5 MG tablet tablet, 2 (Two) Times a Day. Has not started yet, awaiting CT scan results, Disp: , Rfl:   •  ARIPiprazole (ABILIFY) 5 MG tablet, , Disp: , Rfl:   •  aspirin 81 MG EC tablet, Take 1 tablet by mouth Daily., Disp: , Rfl:   •  atorvastatin (LIPITOR) 80 MG tablet, Take 1 tablet by mouth Every Night., Disp: 90 tablet, Rfl: 1  •  chlorthalidone (HYGROTON) 25 MG tablet, Take 1 tablet by mouth Daily., Disp: , Rfl:   •  fluticasone (FLONASE) 50 MCG/ACT nasal spray, , Disp: , Rfl:   •  hydrOXYzine pamoate (VISTARIL) 100 MG capsule, , Disp: , Rfl:   •  ibuprofen  "(ADVIL,MOTRIN) 200 MG tablet, Take 1 tablet by mouth Every 6 (Six) Hours As Needed for Mild Pain., Disp: , Rfl:   •  lisinopril (PRINIVIL,ZESTRIL) 40 MG tablet, Take 1 tablet by mouth Daily., Disp: , Rfl:   •  loratadine (CLARITIN) 10 MG tablet, Take 1 tablet by mouth Daily., Disp: , Rfl:   •  metFORMIN (GLUCOPHAGE) 500 MG tablet, , Disp: , Rfl:   •  metoprolol succinate XL (TOPROL-XL) 25 MG 24 hr tablet, , Disp: , Rfl:   •  Multiple Vitamins-Minerals (EYE VITAMINS PO), Take 1 tablet by mouth 2 (Two) Times a Day., Disp: , Rfl:   •  multivitamin with minerals tablet tablet, Take 1 tablet by mouth Daily., Disp: , Rfl:   •  sertraline (ZOLOFT) 50 MG tablet, , Disp: , Rfl:   •  Testosterone Cypionate (DEPOTESTOTERONE CYPIONATE) 200 MG/ML injection, , Disp: , Rfl:   •  traZODone (DESYREL) 100 MG tablet, , Disp: , Rfl:   •  Testosterone Cypionate (DEPO-TESTOSTERONE IM), Inject  into the appropriate muscle as directed by prescriber Every 21 (Twenty-One) Days. (Patient not taking: Reported on 5/12/2023), Disp: , Rfl:     The following portions of the patient's history were reviewed and updated as appropriate: allergies, current medications, past family history, past medical history, past social history, past surgical history and problem list.      Objective   /64 (BP Location: Right arm, Patient Position: Sitting, Cuff Size: Adult)   Pulse 57   Temp 98.2 °F (36.8 °C) (Temporal)   Ht 177.8 cm (70\")   Wt 97.9 kg (215 lb 12 oz)   SpO2 98%   BMI 30.96 kg/m²   Body mass index is 30.96 kg/m².  Physical Exam  Vitals and nursing note reviewed.   Constitutional:       General: He is not in acute distress.     Appearance: Normal appearance. He is obese. He is not diaphoretic.   HENT:      Head: Normocephalic and atraumatic.      Right Ear: Hearing, ear canal and external ear normal. A middle ear effusion is present.      Left Ear: Hearing, ear canal and external ear normal. A middle ear effusion is present.      Nose: " Nose normal. No congestion or rhinorrhea.      Right Sinus: No maxillary sinus tenderness or frontal sinus tenderness.      Left Sinus: No maxillary sinus tenderness or frontal sinus tenderness.      Mouth/Throat:      Lips: Pink.      Mouth: Mucous membranes are moist.      Pharynx: Oropharynx is clear. Uvula midline.   Eyes:      General: No scleral icterus.     Extraocular Movements: Extraocular movements intact.      Pupils: Pupils are equal, round, and reactive to light.   Neck:      Thyroid: No thyromegaly or thyroid tenderness.      Trachea: Trachea and phonation normal.   Cardiovascular:      Rate and Rhythm: Normal rate and regular rhythm.      Heart sounds: Normal heart sounds. No murmur heard.  Pulmonary:      Effort: Pulmonary effort is normal.      Breath sounds: Normal breath sounds.   Abdominal:      Tenderness: There is no abdominal tenderness.   Musculoskeletal:         General: Normal range of motion.      Cervical back: Normal range of motion and neck supple.   Lymphadenopathy:      Cervical: No cervical adenopathy.   Skin:     General: Skin is warm and dry.      Coloration: Skin is not jaundiced or pale.   Neurological:      General: No focal deficit present.      Mental Status: He is alert and oriented to person, place, and time.      Cranial Nerves: No cranial nerve deficit.      Sensory: No sensory deficit.      Motor: Weakness present.      Gait: Gait abnormal (uses cane for ambulation).      Comments: Slightly delayed responses to questions    Psychiatric:         Mood and Affect: Mood normal.         Behavior: Behavior normal.         Thought Content: Thought content normal.         Judgment: Judgment normal.         Assessment & Plan   Harley Flynn is here today and the following problems have been addressed:      Diagnoses and all orders for this visit:    1. Encounter to establish care (Primary)  -     CBC (No Diff)  -     Comprehensive Metabolic Panel  -     Lipid Panel  -     TSH Rfx  On Abnormal To Free T4  -     Hemoglobin A1c  -     Testosterone (Free & Total), LC / MS    2. Cerebrovascular accident (CVA), unspecified mechanism  -     CBC (No Diff)  -     Comprehensive Metabolic Panel  -     Lipid Panel  -     TSH Rfx On Abnormal To Free T4  -     Hemoglobin A1c    3. Aphasia as late effect of cerebrovascular accident (CVA)    4. Primary hypertension  -     CBC (No Diff)  -     Comprehensive Metabolic Panel  -     Lipid Panel  -     TSH Rfx On Abnormal To Free T4    5. Hyperlipidemia, unspecified hyperlipidemia type  -     atorvastatin (LIPITOR) 80 MG tablet; Take 1 tablet by mouth Every Night.  Dispense: 90 tablet; Refill: 1  -     Lipid Panel    6. Paroxysmal A-fib  -     CBC (No Diff)  -     Comprehensive Metabolic Panel  -     Lipid Panel  -     TSH Rfx On Abnormal To Free T4    7. Anxiety and depression    8. Primary insomnia    9. RAFY (obstructive sleep apnea)  -     CBC (No Diff)    10. Environmental and seasonal allergies    11. Hyperglycemia  -     Hemoglobin A1c    12. Low testosterone  -     Testosterone (Free & Total), LC / MS    CVA and aphasia  He will continue physical therapy and speech therapy. He will follow-up with the neurologist as scheduled. He was notified when to be seen urgently. He will continue medication as prescribed. The patient is holding Arimaz per neurology until repeat CT scan is reviewed and they are waiting for results currently.     Hypertension  His blood pressure is stable. His pulse is 57 bpm, and his blood pressure is lower than his typical readings. Fatigue and dizziness can be contributed to blood pressure being lower than his typical, which was discussed with the patient. If he finds his blood pressure <100/60s consistently or his pulse < 60 bpm and he continues to have dizziness and lightheadedness, he may be able to take 1/2 of his beta-blocker. However, he needs to make sure he is in increasing water and doing slow position changes. He can  discuss this further with the cardiologist. The patient will continue monitoring his blood pressure and pulse at home, and notify us with any abnormal readings.     Hyperlipidemia   Low cholesterol diet. Atorvastatin was refilled that was recently prescribed by neurology. His lipid panel and laboratory tests will be updated since he has been on the medication for 1 month.     Atrial fibrillation  He has a regular heart rate and rhythm at this time, asymptomatic. He will on continue medications as prescribed. He will follow up with the cardiologist to establish care as scheduled.     Anxiety, depression, primary insomnia  He will follow up with MIRACLE Man     Sleep Apnea  Follow up sleep medicine provider     Seasonal allergies  The patient complained of congestion. He was not administering the Flonase Nasal Spray at an angle towards his sinus and ears. The correct way was demonstrated, use 2 sprays in each nostril. OTC antihistamines as needed. Needs to be careful about the medications he takes for sinuses OTC, and should especially not take Sudafed. He is not tender in his sinuses. He does not have any fever, myalgia, or chills. He does have middle ear effusion. Discussed valsalva maneuver for ears. He can return if symptoms persist and or worsen.     Hyperglycemia   He was told by his previous provider that his blood glucose was slightly e elevated, but not at diabetic level. They started him on metformin recently. His labs testing will be checked.     Low testosterone  He has a history of being on testosterone injections for many years that was managed by his previous primary provider. Discussed with patient that that testosterone is typically not managed here in this office, but by a urologist. His testosterone will be checked, fasting at 8:00 AM. If it is low, and he is cleared to take this by his cardiologist and neurology, he can be referred to a urologist. The patient verbalizes understanding.      He will return in about 3 months, or sooner if needed, for his Medicare wellness visit. He does not need any other further refills at this time. His atorvastatin was sent into the [facility]. He has laboratory tests ordered, which he will return next week fasting to complete. Discussed that they do not need an appointment to complete the blood work. Denies any other further concerns or complaints today. It was a pleasure to meet the patient and his wife and look forward to continuing to manage his chronic care conditions.    I spent 50 minutes caring for Harley Flynn on this date of service. This time includes time spent by me in the following activities: preparing for the visit, reviewing tests, performing a medically appropriate examination and/or evaluation, counseling and educating the patient/family/caregiver, ordering medications, tests, or procedures and documenting information in the medical record.    Follow Up   Return in about 3 months (around 8/12/2023) for Medicare Wellness.  Patient was given instructions and counseling regarding his condition or for health maintenance advice. Please see specific information pulled into the AVS if appropriate.     Marielle RAUSCH  [facility]    Transcribed from ambient dictation for MIRACLE Walters by Maddy Cho.  05/12/23   17:10 EDT    I have personally performed the services described in this document as transcribed by the above individual, and it is both accurate and complete.

## 2023-05-19 LAB
ALBUMIN SERPL-MCNC: 4.6 G/DL (ref 3.5–5.2)
ALBUMIN/GLOB SERPL: 2.2 G/DL
ALP SERPL-CCNC: 109 U/L (ref 39–117)
ALT SERPL-CCNC: 27 U/L (ref 1–41)
AST SERPL-CCNC: 20 U/L (ref 1–40)
BILIRUB SERPL-MCNC: 0.4 MG/DL (ref 0–1.2)
BUN SERPL-MCNC: 24 MG/DL (ref 8–23)
BUN/CREAT SERPL: 32 (ref 7–25)
CALCIUM SERPL-MCNC: 9.9 MG/DL (ref 8.6–10.5)
CHLORIDE SERPL-SCNC: 104 MMOL/L (ref 98–107)
CHOLEST SERPL-MCNC: 136 MG/DL (ref 0–200)
CO2 SERPL-SCNC: 26.5 MMOL/L (ref 22–29)
CREAT SERPL-MCNC: 0.75 MG/DL (ref 0.76–1.27)
EGFRCR SERPLBLD CKD-EPI 2021: 97.7 ML/MIN/1.73
ERYTHROCYTE [DISTWIDTH] IN BLOOD BY AUTOMATED COUNT: 12.9 % (ref 12.3–15.4)
GLOBULIN SER CALC-MCNC: 2.1 GM/DL
GLUCOSE SERPL-MCNC: 111 MG/DL (ref 65–99)
HBA1C MFR BLD: 5.8 % (ref 4.8–5.6)
HCT VFR BLD AUTO: 41.1 % (ref 37.5–51)
HDLC SERPL-MCNC: 60 MG/DL (ref 40–60)
HGB BLD-MCNC: 13.5 G/DL (ref 13–17.7)
LDLC SERPL CALC-MCNC: 60 MG/DL (ref 0–100)
MCH RBC QN AUTO: 31.5 PG (ref 26.6–33)
MCHC RBC AUTO-ENTMCNC: 32.8 G/DL (ref 31.5–35.7)
MCV RBC AUTO: 95.8 FL (ref 79–97)
PLATELET # BLD AUTO: 325 10*3/MM3 (ref 140–450)
POTASSIUM SERPL-SCNC: 4.6 MMOL/L (ref 3.5–5.2)
PROT SERPL-MCNC: 6.7 G/DL (ref 6–8.5)
RBC # BLD AUTO: 4.29 10*6/MM3 (ref 4.14–5.8)
SODIUM SERPL-SCNC: 143 MMOL/L (ref 136–145)
TESTOST FREE SERPL-MCNC: 2.4 PG/ML (ref 6.6–18.1)
TESTOST SERPL-MCNC: 156.5 NG/DL (ref 264–916)
TRIGL SERPL-MCNC: 84 MG/DL (ref 0–150)
TSH SERPL DL<=0.005 MIU/L-ACNC: 2.17 UIU/ML (ref 0.27–4.2)
VLDLC SERPL CALC-MCNC: 16 MG/DL (ref 5–40)
WBC # BLD AUTO: 8.49 10*3/MM3 (ref 3.4–10.8)

## 2023-05-24 DIAGNOSIS — R79.89 LOW TESTOSTERONE: Primary | ICD-10-CM

## 2023-06-08 DIAGNOSIS — E78.5 HYPERLIPIDEMIA, UNSPECIFIED HYPERLIPIDEMIA TYPE: ICD-10-CM

## 2023-06-08 NOTE — TELEPHONE ENCOUNTER
Caller: [facility] - [place], [place] - [address] - [phone] [facility] [phone] FX    Relationship: Pharmacy    Best call back number: [phone]    Requested Prescriptions:   Requested Prescriptions     Pending Prescriptions Disp Refills    apixaban (ELIQUIS) 5 MG tablet tablet 180 tablet 1     Sig: Take 1 tablet by mouth 2 (Two) Times a Day.    amLODIPine (NORVASC) 2.5 MG tablet 90 tablet 1     Sig: Take 1 tablet by mouth Daily.    lisinopril (PRINIVIL,ZESTRIL) 40 MG tablet 90 tablet 1     Sig: Take 1 tablet by mouth Daily.    metFORMIN (GLUCOPHAGE) 500 MG tablet      atorvastatin (LIPITOR) 80 MG tablet 90 tablet 1     Sig: Take 1 tablet by mouth Every Night.    sertraline (ZOLOFT) 50 MG tablet          Pharmacy where request should be sent: [facility] - [place], [place] - [address] - [phone] [facility] [phone] FX     Last office visit with prescribing clinician: 5/12/2023   Last telemedicine visit with prescribing clinician: Visit date not found   Next office visit with prescribing clinician: 8/18/2023     Additional details provided by patient: PHARMACY IS ASKING FOR THESE PRESCRIPTIONS TO BE REACTIVATED PER PATIENTS REQUEST.    Does the patient have less than a 3 day supply:  [] Yes  [x] No    Would you like a call back once the refill request has been completed: [] Yes [x] No    If the office needs to give you a call back, can they leave a voicemail: [] Yes [x] No    Edwar Toribio Rep   06/08/23 14:31 EDT

## 2023-06-09 RX ORDER — ATORVASTATIN CALCIUM 80 MG/1
80 TABLET, FILM COATED ORAL NIGHTLY
Qty: 90 TABLET | Refills: 1 | Status: SHIPPED | OUTPATIENT
Start: 2023-06-09

## 2023-06-09 RX ORDER — AMLODIPINE BESYLATE 2.5 MG/1
2.5 TABLET ORAL DAILY
Qty: 90 TABLET | Refills: 1 | Status: SHIPPED | OUTPATIENT
Start: 2023-06-09

## 2023-06-09 RX ORDER — LISINOPRIL 40 MG/1
40 TABLET ORAL DAILY
Qty: 90 TABLET | Refills: 1 | Status: SHIPPED | OUTPATIENT
Start: 2023-06-09

## 2023-07-20 ENCOUNTER — TELEPHONE (OUTPATIENT)
Dept: UROLOGY | Facility: CLINIC | Age: 68
End: 2023-07-20

## 2023-07-20 NOTE — TELEPHONE ENCOUNTER
Caller: Harley Flynn     Relationship to patient: SELF    Best call back number: [phone]     Patient is needing: PT WAS RETURNING [facility] CALL. PT HAD AN APPT TOMORROW AND THE OFFICE NEEDED INFO, THEY BELIEVE THEY HAVE ALL THE INFO NEEDED. PLEASE GIVE PT A CALL BACK.

## 2023-08-09 DIAGNOSIS — E29.1 HYPOGONADISM IN MALE: ICD-10-CM

## 2023-08-09 RX ORDER — TESTOSTERONE CYPIONATE 200 MG/ML
INJECTION, SOLUTION INTRAMUSCULAR
Qty: 1 ML | Refills: 0 | Status: SHIPPED | OUTPATIENT
Start: 2023-08-09

## 2023-08-18 ENCOUNTER — OFFICE VISIT (OUTPATIENT)
Dept: INTERNAL MEDICINE | Facility: CLINIC | Age: 68
End: 2023-08-18
Payer: MEDICARE

## 2023-08-18 VITALS
HEART RATE: 60 BPM | WEIGHT: 230 LBS | SYSTOLIC BLOOD PRESSURE: 134 MMHG | TEMPERATURE: 98.4 F | HEIGHT: 70 IN | OXYGEN SATURATION: 98 % | DIASTOLIC BLOOD PRESSURE: 78 MMHG | BODY MASS INDEX: 32.93 KG/M2

## 2023-08-18 DIAGNOSIS — G47.33 OSA (OBSTRUCTIVE SLEEP APNEA): ICD-10-CM

## 2023-08-18 DIAGNOSIS — R73.9 HYPERGLYCEMIA: ICD-10-CM

## 2023-08-18 DIAGNOSIS — F41.9 ANXIETY AND DEPRESSION: ICD-10-CM

## 2023-08-18 DIAGNOSIS — J30.89 ENVIRONMENTAL AND SEASONAL ALLERGIES: ICD-10-CM

## 2023-08-18 DIAGNOSIS — E78.5 HYPERLIPIDEMIA, UNSPECIFIED HYPERLIPIDEMIA TYPE: ICD-10-CM

## 2023-08-18 DIAGNOSIS — I48.0 PAROXYSMAL A-FIB: ICD-10-CM

## 2023-08-18 DIAGNOSIS — Z00.00 MEDICARE ANNUAL WELLNESS VISIT, SUBSEQUENT: Primary | ICD-10-CM

## 2023-08-18 DIAGNOSIS — I10 PRIMARY HYPERTENSION: ICD-10-CM

## 2023-08-18 DIAGNOSIS — J01.40 ACUTE NON-RECURRENT PANSINUSITIS: ICD-10-CM

## 2023-08-18 DIAGNOSIS — E66.09 CLASS 1 OBESITY DUE TO EXCESS CALORIES WITH SERIOUS COMORBIDITY AND BODY MASS INDEX (BMI) OF 33.0 TO 33.9 IN ADULT: ICD-10-CM

## 2023-08-18 DIAGNOSIS — F51.01 PRIMARY INSOMNIA: ICD-10-CM

## 2023-08-18 DIAGNOSIS — F32.A ANXIETY AND DEPRESSION: ICD-10-CM

## 2023-08-18 RX ORDER — AMOXICILLIN AND CLAVULANATE POTASSIUM 875; 125 MG/1; MG/1
1 TABLET, FILM COATED ORAL 2 TIMES DAILY
Qty: 14 TABLET | Refills: 0 | Status: SHIPPED | OUTPATIENT
Start: 2023-08-18 | End: 2023-08-25

## 2023-08-18 NOTE — PROGRESS NOTES
Subsequent Medicare Wellness Visit    Subjective    Harley Flynn is a 67 y.o. male who presents for a Subsequent Medicare Wellness Visit.  He is up to date with pneumonia, tetanus. Influenza will be due in Oct. He is due a shingles vaccine. He has only had 1 Zostavax that I see. He needs a COVID-19 booster unless he has had the bivalent last time. He gets these at the pharmacy. Says he is due for AAA screen, but he does have a CT of his chest, which shows a normal aorta in January. He is up to date with colonoscopy, lipid panel, and hepatitis C screening. He is taking his medications as prescribed. Follows cardiologist. He has hypertension, history of atrial fibrillation. HLD. Conditions are stable. Taking medications as prescribed. He is on anticoagulatoin with eliquis. Denies chest pain, shortness of breath, tachycardia, palpitations, headache, visual changes. He is up to date with dental and eye exams. He has problems being active due to chronic pain. Anxiety and depression are stable on sertraline. Sleeping well with trazodone. Compliant with CPAP. Allergies are uncontrolled at this time, but has not been taking Claritin or Flonase as directed. Hyperglycemia; last A1c was 5.8 % He has gained 15 pounds since 05/2023. He does drink coffee in the morning, and then 2 diet sodas a day. States he likes potatoes and carbs. Low testosterone treated by urology. Has appointments for vision and dental.       The following portions of the patient's history were reviewed and   updated as appropriate: allergies, current medications, past family history, past medical history, past social history, past surgical history, and problem list.    Compared to one year ago, the patient feels his physical   health is worse.    Compared to one year ago, the patient feels his mental   health is the same.    Recent Hospitalizations:  He was admitted within the past 365 days at [facility].       Current Medical  Providers:  Patient Care Team:  Marielle Yin APRN as PCP - General (Family Medicine)    Outpatient Medications Prior to Visit   Medication Sig Dispense Refill    acetaminophen (TYLENOL) 500 MG tablet       amLODIPine (NORVASC) 10 MG tablet Take 1 tablet by mouth Daily.      amLODIPine (NORVASC) 2.5 MG tablet Take 1 tablet by mouth Daily. 90 tablet 1    apixaban (ELIQUIS) 5 MG tablet tablet Take 1 tablet by mouth 2 (Two) Times a Day. 180 tablet 1    ARIPiprazole (ABILIFY) 5 MG tablet       atorvastatin (LIPITOR) 80 MG tablet Take 1 tablet by mouth Every Night. 90 tablet 1    chlorthalidone (HYGROTON) 25 MG tablet Take 1 tablet by mouth Daily.      fluticasone (FLONASE) 50 MCG/ACT nasal spray       hydrOXYzine pamoate (VISTARIL) 100 MG capsule       lisinopril (PRINIVIL,ZESTRIL) 40 MG tablet Take 1 tablet by mouth Daily. 90 tablet 1    loratadine (CLARITIN) 10 MG tablet Take 1 tablet by mouth Daily.      metFORMIN (GLUCOPHAGE) 500 MG tablet Take 1 tablet by mouth 2 (Two) Times a Day With Meals. 180 tablet 1    metoprolol succinate XL (TOPROL-XL) 25 MG 24 hr tablet       Multiple Vitamins-Minerals (EYE VITAMINS PO) Take 1 tablet by mouth 2 (Two) Times a Day.      multivitamin with minerals tablet tablet Take 1 tablet by mouth Daily.      sertraline (ZOLOFT) 50 MG tablet Take 1 tablet by mouth Daily. 90 tablet 1    Testosterone Cypionate (DEPOTESTOTERONE CYPIONATE) 200 MG/ML injection INJECT 1 MILLILITER INTRAMUSCULARLY ONCE EVERY 21 DAYS 1 mL 0    traZODone (DESYREL) 100 MG tablet       aspirin 81 MG EC tablet Take 1 tablet by mouth Daily. (Patient not taking: Reported on 7/7/2023)      ibuprofen (ADVIL,MOTRIN) 200 MG tablet Take 1 tablet by mouth Every 6 (Six) Hours As Needed for Mild Pain.       No facility-administered medications prior to visit.       No opioid medication identified on active medication list. I have reviewed chart for other potential  high risk medication/s and harmful drug interactions in the  "elderly.        Aspirin is not on active medication list.  Take if directed by cardiology.     Patient Active Problem List   Diagnosis    Hyperlipidemia    Paroxysmal A-fib    Anxiety and depression    Primary insomnia    RAFY (obstructive sleep apnea)    Environmental and seasonal allergies    Hyperglycemia     Advance Care Planning   Advance Care Planning     Advance Directive is not on file.  ACP discussion was held with the patient during this visit. Patient has an advance directive (not in EMR), copy requested.     Objective    Vitals:    23 0932   BP: 134/78   Pulse: 60   Temp: 98.4 øF (36.9 øC)   SpO2: 98%   Weight: 104 kg (230 lb)   Height: 177.8 cm (70\")   PainSc:   7     Estimated body mass index is 33 kg/mý as calculated from the following:    Height as of this encounter: 177.8 cm (70\").    Weight as of this encounter: 104 kg (230 lb).    BMI is >= 30 and <35. (Class 1 Obesity). The following options were offered after discussion;: exercise counseling/recommendations and nutrition counseling/recommendations      Does the patient have evidence of cognitive impairment? No          HEALTH RISK ASSESSMENT    Smoking Status:  Social History     Tobacco Use   Smoking Status Former    Packs/day: 2.00    Years: 25.00    Pack years: 50.00    Types: Cigarettes    Start date: 1966    Quit date: 1991    Years since quittin.6   Smokeless Tobacco Never   Tobacco Comments    Smoked since age 11     Alcohol Consumption:  Social History     Substance and Sexual Activity   Alcohol Use Not Currently    Comment: Nothing for 30+ years.   Prior, casual drinking     Fall Risk Screen:    STEADI Fall Risk Assessment was completed, and patient is at MODERATE risk for falls. Assessment completed on:2023    Depression Screenin/18/2023     9:35 AM   PHQ-2/PHQ-9 Depression Screening   Little Interest or Pleasure in Doing Things 0-->not at all   Feeling Down, Depressed or Hopeless 0-->not at all "   PHQ-9: Brief Depression Severity Measure Score 0       Health Habits and Functional and Cognitive Screenin/18/2023     9:33 AM   Functional & Cognitive Status   Do you have difficulty preparing food and eating? No   Do you have difficulty bathing yourself, getting dressed or grooming yourself? No   Do you have difficulty using the toilet? No   Do you have difficulty moving around from place to place? Yes   Do you have trouble with steps or getting out of a bed or a chair? Yes   Current Diet Limited Junk Food   Dental Exam Not up to date   Eye Exam Up to date   Exercise (times per week) 5 times per week   Do you need help using the phone?  No   Are you deaf or do you have serious difficulty hearing?  No   Do you need help to go to places out of walking distance? No   Do you need help shopping? Yes   Do you need help preparing meals?  No   Do you need help with housework?  No   Do you need help with laundry? No   Do you need help taking your medications? Yes   Do you need help managing money? Yes   Do you ever drive or ride in a car without wearing a seat belt? No   Have you felt unusual stress, anger or loneliness in the last month? No   Who do you live with? Spouse   If you need help, do you have trouble finding someone available to you? No   Have you been bothered in the last four weeks by sexual problems? No   Do you have difficulty concentrating, remembering or making decisions? No       Age-appropriate Screening Schedule:  Refer to the list below for future screening recommendations based on patient's age, sex and/or medical conditions. Orders for these recommended tests are listed in the plan section. The patient has been provided with a written plan.    Health Maintenance   Topic Date Due    ANNUAL WELLNESS VISIT  Never done    AAA SCREEN (ONE-TIME)  2023 (Originally 2023)    COVID-19 Vaccine (5 - Moderna series) 2023 (Originally 2022)    ZOSTER VACCINE (2 of 3) 2024  "(Originally 6/6/2016)    INFLUENZA VACCINE  10/01/2023    TDAP/TD VACCINES (2 - Td or Tdap) 03/20/2024    LIPID PANEL  05/15/2024    COLORECTAL CANCER SCREENING  10/01/2031    HEPATITIS C SCREENING  Completed    Pneumococcal Vaccine 65+  Completed                  Risk Factors Identified During Encounter  Chronic Pain: Natural history and expected course discussed. Questions answered.  Follows orthopedics   Depression/Dysphoria: Current medication is effective, no change recommended  Fall Risk-High or Moderate: Discussed Fall Prevention in the home  Immunizations Discussed/Encouraged: Influenza, Shingrix, and COVID19  Inactivity/Sedentary: Patient was advised to exercise at least 150 minutes a week per CDC recommendations.  Dental Screening Recommended  Vision Screening Recommended  The above risks/problems have been discussed with the patient.  Pertinent information has been shared with the patient in the After Visit Summary.  An After Visit Summary and PPPS were made available to the patient.    Follow Up:   Next Medicare Wellness visit to be scheduled in 1 year.       Additional E&M Note during same encounter follows:  Patient has multiple medical problems which are significant and separately identifiable that require additional work above and beyond the Medicare Wellness Visit.      Chief Complaint  Medicare Wellness-subsequent, Annual Exam, and Cough (And congestion)    Subjective        HPI  Harley Flynn is also being seen today for sinus congestion/cough for the past 2 to 3 weeks. He is taking Coricidin over-the-counter, using Flonase as needed, but not daily, and he has not been taking his Claritin for allergies. No fever, myalgia, or chills, but he does have significant sinus pressure and pain, especially when leaning over and bending forward. He has a neti pot, but he has not been using it.       Objective   Vital Signs:  /78   Pulse 60   Temp 98.4 øF (36.9 øC)   Ht 177.8 cm (70\")   Wt 104 kg " (230 lb)   SpO2 98%   BMI 33.00 kg/mý     Physical Exam  Vitals and nursing note reviewed.   Constitutional:       General: He is not in acute distress.     Appearance: Normal appearance. He is obese.   HENT:      Head: Normocephalic and atraumatic.      Right Ear: Ear canal and external ear normal. A middle ear effusion is present.      Left Ear: Ear canal and external ear normal. A middle ear effusion is present.      Nose: Nose normal. Congestion and rhinorrhea present. Rhinorrhea is clear.      Right Sinus: Maxillary sinus tenderness and frontal sinus tenderness present.      Left Sinus: Maxillary sinus tenderness and frontal sinus tenderness present.      Mouth/Throat:      Mouth: Mucous membranes are moist.      Pharynx: Oropharynx is clear.   Eyes:      General: No scleral icterus.     Extraocular Movements: Extraocular movements intact.      Conjunctiva/sclera: Conjunctivae normal.      Pupils: Pupils are equal, round, and reactive to light.   Neck:      Thyroid: No thyromegaly.      Vascular: No carotid bruit.      Trachea: Phonation normal.   Cardiovascular:      Rate and Rhythm: Normal rate and regular rhythm.      Pulses: Normal pulses.   Pulmonary:      Effort: Pulmonary effort is normal.      Breath sounds: Normal breath sounds.   Abdominal:      General: Abdomen is flat. Bowel sounds are normal. There is no distension.      Palpations: Abdomen is soft.      Tenderness: There is no abdominal tenderness. There is no guarding.   Musculoskeletal:         General: Deformity present.      Cervical back: Normal range of motion and neck supple.      Right lower leg: No edema.      Left lower leg: No edema.      Comments: Using cane for ambulation assistance    Lymphadenopathy:      Cervical: No cervical adenopathy.   Skin:     General: Skin is warm and dry.      Coloration: Skin is not jaundiced or pale.      Findings: No rash.   Neurological:      Mental Status: He is alert and oriented to person, place,  and time.      Cranial Nerves: No cranial nerve deficit.      Motor: No weakness.      Coordination: Coordination normal.      Gait: Gait abnormal.   Psychiatric:         Mood and Affect: Mood normal.         Behavior: Behavior normal.          Assessment and Plan   Diagnoses and all orders for this visit:    1. Medicare annual wellness visit, subsequent (Primary)    2. Hyperlipidemia, unspecified hyperlipidemia type  -     Lipid Panel; Future    3. Primary hypertension  -     Cancel: CBC (No Diff)  -     Cancel: Comprehensive Metabolic Panel  -     CBC (No Diff); Future  -     Comprehensive Metabolic Panel; Future  -     Lipid Panel; Future    4. Paroxysmal A-fib    5. Hyperglycemia  -     Cancel: Hemoglobin A1c  -     Comprehensive Metabolic Panel; Future  -     Hemoglobin A1c; Future    6. Anxiety and depression    7. Primary insomnia    8. RAFY (obstructive sleep apnea)  -     Cancel: CBC (No Diff)  -     Cancel: Comprehensive Metabolic Panel    9. Environmental and seasonal allergies    10. Acute non-recurrent pansinusitis  -     amoxicillin-clavulanate (AUGMENTIN) 875-125 MG per tablet; Take 1 tablet by mouth 2 (Two) Times a Day for 7 days.  Dispense: 14 tablet; Refill: 0    11. Class 1 obesity due to excess calories with serious comorbidity and body mass index (BMI) of 33.0 to 33.9 in adult    HTN/HLD  Eat a low cholesterol, heart healthy diet, low sodium, exercise 30 minutes 5 days a week. Monitor blood pressure as advised. Goal is less than 130/80 mmHg. Systolic slightly elevated. Overall stable at home. We monitor.    Atrial fibrillation  Stable. Continue anticoagulation, following cardiology, medications as prescribed.    Anxiety and depression  Stable. Continue medications.    Primary insomnia  Stable. Continue medications.     RAFY  Stable. Recommend compliance with CPAP.    Allergies  Uncontrolled at this time. He has a sinus infection today. We will treat and resume Claritin and fluticasone nasal spray  as directed.    Hyperglycemia  Last A1c 5.8 percent. Discussed low carb, high protein diet and try to get 30 minutes of exercise daily. Discussed discontinuing soda and drinking more water.     Sinusitis  Recommend Augmentin prescribed. Saline rinses, plain Mucinex over the counter, nasal saline rinses as needed, breathe in warm steam from the shower prior to saline rinse, warm compresses to face. Follow up if symptoms persist and or worsen. Discussed taking the antibiotic with food. Discussed risk of GI upset with antibiotic and to notify if any significant symptoms.     Obesity  BMI is >= 30 and <35. (Class 1 Obesity). The following options were offered after discussion;: weight loss educational material (shared in after visit summary), exercise counseling/recommendations, and nutrition counseling/recommendations    HCM: His screenings are up to date. We will obtain colonoscopy report as we do not have. I recommend he get the COVID-19 booster if he has not had the bivalent at the pharmacy as well as a Shingrix because I see he has only had one Zostavax, which is not recommended anymore. His labs are reviewed with him and up to date. We will repeat labs in 10/2023. He is to return in 10/2023 for a flu vaccine or can obtain at the pharmacy as well. No other complaints or concerns today. Follow up in 6 months or sooner as needed.        Follow Up   Return in about 6 months (around 2/18/2024).  Patient was given instructions and counseling regarding his condition or for health maintenance advice. Please see specific information pulled into the AVS if appropriate.         Transcribed from ambient dictation for MIRACLE Walters by Dotty Olivas.  08/18/23   14:04 EDT    I have personally performed the services described in this document as transcribed by the above individual, and it is both accurate and complete.

## 2023-08-30 DIAGNOSIS — E29.1 HYPOGONADISM IN MALE: ICD-10-CM

## 2023-08-30 RX ORDER — TESTOSTERONE CYPIONATE 200 MG/ML
INJECTION, SOLUTION INTRAMUSCULAR
Qty: 1 ML | Refills: 0 | Status: SHIPPED | OUTPATIENT
Start: 2023-08-30

## 2023-09-27 DIAGNOSIS — E29.1 HYPOGONADISM IN MALE: ICD-10-CM

## 2023-09-28 RX ORDER — TESTOSTERONE CYPIONATE 200 MG/ML
INJECTION, SOLUTION INTRAMUSCULAR
Qty: 1 ML | Refills: 0 | Status: SHIPPED | OUTPATIENT
Start: 2023-09-28

## 2023-10-02 RX ORDER — APIXABAN 5 MG/1
TABLET, FILM COATED ORAL
Qty: 180 TABLET | Refills: 1 | Status: SHIPPED | OUTPATIENT
Start: 2023-10-02

## 2023-10-05 ENCOUNTER — TELEPHONE (OUTPATIENT)
Dept: UROLOGY | Facility: CLINIC | Age: 68
End: 2023-10-05
Payer: MEDICARE

## 2023-10-06 ENCOUNTER — LAB (OUTPATIENT)
Dept: LAB | Facility: HOSPITAL | Age: 68
End: 2023-10-06
Payer: MEDICARE

## 2023-10-06 DIAGNOSIS — E78.5 HYPERLIPIDEMIA, UNSPECIFIED HYPERLIPIDEMIA TYPE: ICD-10-CM

## 2023-10-06 DIAGNOSIS — I10 PRIMARY HYPERTENSION: ICD-10-CM

## 2023-10-06 DIAGNOSIS — R73.9 HYPERGLYCEMIA: ICD-10-CM

## 2023-10-06 DIAGNOSIS — E29.1 HYPOGONADISM IN MALE: ICD-10-CM

## 2023-10-06 LAB
ALBUMIN SERPL-MCNC: 4.7 G/DL (ref 3.5–5.2)
ALBUMIN/GLOB SERPL: 1.7 G/DL
ALP SERPL-CCNC: 98 U/L (ref 39–117)
ALT SERPL W P-5'-P-CCNC: 23 U/L (ref 1–41)
ANION GAP SERPL CALCULATED.3IONS-SCNC: 11.5 MMOL/L (ref 5–15)
AST SERPL-CCNC: 19 U/L (ref 1–40)
BILIRUB SERPL-MCNC: 0.7 MG/DL (ref 0–1.2)
BUN SERPL-MCNC: 16 MG/DL (ref 8–23)
BUN/CREAT SERPL: 18 (ref 7–25)
CALCIUM SPEC-SCNC: 9.5 MG/DL (ref 8.6–10.5)
CHLORIDE SERPL-SCNC: 100 MMOL/L (ref 98–107)
CHOLEST SERPL-MCNC: 109 MG/DL (ref 0–200)
CO2 SERPL-SCNC: 28.5 MMOL/L (ref 22–29)
CREAT SERPL-MCNC: 0.89 MG/DL (ref 0.76–1.27)
DEPRECATED RDW RBC AUTO: 44.9 FL (ref 37–54)
EGFRCR SERPLBLD CKD-EPI 2021: 92.2 ML/MIN/1.73
ERYTHROCYTE [DISTWIDTH] IN BLOOD BY AUTOMATED COUNT: 13.3 % (ref 12.3–15.4)
GLOBULIN UR ELPH-MCNC: 2.8 GM/DL
GLUCOSE SERPL-MCNC: 101 MG/DL (ref 65–99)
HBA1C MFR BLD: 6.2 % (ref 4.8–5.6)
HCT VFR BLD AUTO: 43.2 % (ref 37.5–51)
HDLC SERPL-MCNC: 55 MG/DL (ref 40–60)
HGB BLD-MCNC: 14.7 G/DL (ref 13–17.7)
LDLC SERPL CALC-MCNC: 42 MG/DL (ref 0–100)
LDLC/HDLC SERPL: 0.79 {RATIO}
MCH RBC QN AUTO: 31.1 PG (ref 26.6–33)
MCHC RBC AUTO-ENTMCNC: 34 G/DL (ref 31.5–35.7)
MCV RBC AUTO: 91.5 FL (ref 79–97)
PLATELET # BLD AUTO: 373 10*3/MM3 (ref 140–450)
PMV BLD AUTO: 9.7 FL (ref 6–12)
POTASSIUM SERPL-SCNC: 4.1 MMOL/L (ref 3.5–5.2)
PROT SERPL-MCNC: 7.5 G/DL (ref 6–8.5)
RBC # BLD AUTO: 4.72 10*6/MM3 (ref 4.14–5.8)
SODIUM SERPL-SCNC: 140 MMOL/L (ref 136–145)
TESTOST SERPL-MCNC: 1053 NG/DL (ref 193–740)
TRIGL SERPL-MCNC: 53 MG/DL (ref 0–150)
VLDLC SERPL-MCNC: 12 MG/DL (ref 5–40)
WBC NRBC COR # BLD: 8.75 10*3/MM3 (ref 3.4–10.8)

## 2023-10-06 PROCEDURE — 84403 ASSAY OF TOTAL TESTOSTERONE: CPT

## 2023-10-06 PROCEDURE — 83036 HEMOGLOBIN GLYCOSYLATED A1C: CPT

## 2023-10-06 PROCEDURE — 80053 COMPREHEN METABOLIC PANEL: CPT

## 2023-10-06 PROCEDURE — 85027 COMPLETE CBC AUTOMATED: CPT

## 2023-10-06 PROCEDURE — 36415 COLL VENOUS BLD VENIPUNCTURE: CPT

## 2023-10-06 PROCEDURE — 80061 LIPID PANEL: CPT

## 2023-10-06 PROCEDURE — 84153 ASSAY OF PSA TOTAL: CPT | Performed by: NURSE PRACTITIONER

## 2023-10-09 NOTE — PROGRESS NOTES
A1c has went up to 6.2%.  Avoid sweets, limit carbs, try to get 30 minutes or more of exercise 5 days a week.  If it continues to go up we will need to increase metformin dose.  Other labs are normal

## 2023-10-11 ENCOUNTER — OFFICE VISIT (OUTPATIENT)
Dept: UROLOGY | Facility: CLINIC | Age: 68
End: 2023-10-11
Payer: MEDICARE

## 2023-10-11 VITALS
SYSTOLIC BLOOD PRESSURE: 130 MMHG | WEIGHT: 230 LBS | BODY MASS INDEX: 32.93 KG/M2 | DIASTOLIC BLOOD PRESSURE: 76 MMHG | HEIGHT: 70 IN

## 2023-10-11 DIAGNOSIS — E29.1 HYPOGONADISM IN MALE: ICD-10-CM

## 2023-10-11 DIAGNOSIS — R97.20 ELEVATED PROSTATE SPECIFIC ANTIGEN (PSA): ICD-10-CM

## 2023-10-11 DIAGNOSIS — N40.0 BENIGN PROSTATIC HYPERPLASIA, UNSPECIFIED WHETHER LOWER URINARY TRACT SYMPTOMS PRESENT: Primary | ICD-10-CM

## 2023-10-11 NOTE — PROGRESS NOTES
Office Visit Established Male Patient     Patient Name: Harley Flynn  : 1955   MRN: [id number]     Chief Complaint:   Chief Complaint   Patient presents with    Follow-up     Hypogonadism        History of Present Illness: Mr. Harley Flynn is a 68 y.o. male who presents today for follow up with hypogonadism.   Restarted his testosterone after approval from his Cardiologist. He can tell a great difference in his fatigue  Denies any urinary symptoms, rectal pain, or perineal pain       Subjective      Review of System:   As noted in HPI    Past Medical History:   Past Medical History:   Diagnosis Date    Anxiety and depression     Arthritis     Chronic pain     states needs a new right knee replacement (replaced in )    Constipation     Deep vein thrombosis 2023    Stroke from blood clot    Elevated cholesterol     Erectile dysfunction 2017    History of nuclear stress test 2016    pt states was WNL    Hypertension     Neuromuscular disorder 1999    RSD since .    Resolved itself earlier     PONV (postoperative nausea and vomiting)     Seasonal allergies     Sleep apnea     Stroke 04/15/2023    Visual impairment 04/15/2023    Blurry & right peripheral    Wears glasses        Past Surgical History:   Past Surgical History:   Procedure Laterality Date    APPENDECTOMY      CARPAL TUNNEL RELEASE Right 2022    Procedure: RIGHT CARPAL TUNNEL RELEASE AND RIGHT CUBITAL TUNNEL RELEASE;  Surgeon: Caleb Cristina MD;  Location: [facility];  Service: Orthopedics;  Laterality: Right;    COLONOSCOPY      JOINT REPLACEMENT  3023    Revision of 1999 replacement    REPLACEMENT TOTAL KNEE Right     WISDOM TOOTH EXTRACTION         Family History:   Family History   Problem Relation Age of Onset    Diabetes Mother     Hypertension Mother     Mental illness Mother     Hyperlipidemia Father        Social History:   Social History     Socioeconomic History    Marital status:     Tobacco Use    Smoking status: Former     Packs/day: 2.00     Years: 25.00     Additional pack years: 0.00     Total pack years: 50.00     Types: Cigarettes     Start date: 1966     Quit date: 1991     Years since quittin.7    Smokeless tobacco: Never    Tobacco comments:     Smoked since age 11   Vaping Use    Vaping Use: Never used   Substance and Sexual Activity    Alcohol use: Not Currently     Comment: Nothing for 30+ years.   Prior, casual drinking    Drug use: Never    Sexual activity: Not Currently     Partners: Female     Birth control/protection: Abstinence     Comment: Only partner is wife of 41 yes but i have ED now       Medications:     Current Outpatient Medications:     acetaminophen (TYLENOL) 500 MG tablet, , Disp: , Rfl:     amLODIPine (NORVASC) 10 MG tablet, Take 1 tablet by mouth Daily., Disp: , Rfl:     amLODIPine (NORVASC) 2.5 MG tablet, Take 1 tablet by mouth Daily., Disp: 90 tablet, Rfl: 1    ARIPiprazole (ABILIFY) 5 MG tablet, , Disp: , Rfl:     atorvastatin (LIPITOR) 80 MG tablet, Take 1 tablet by mouth Every Night., Disp: 90 tablet, Rfl: 1    chlorthalidone (HYGROTON) 25 MG tablet, Take 1 tablet by mouth Daily., Disp: , Rfl:     Eliquis 5 MG tablet tablet, TAKE 1 TABLET TWICE DAILY, Disp: 180 tablet, Rfl: 1    fluticasone (FLONASE) 50 MCG/ACT nasal spray, , Disp: , Rfl:     hydrOXYzine pamoate (VISTARIL) 100 MG capsule, , Disp: , Rfl:     lisinopril (PRINIVIL,ZESTRIL) 40 MG tablet, Take 1 tablet by mouth Daily., Disp: 90 tablet, Rfl: 1    loratadine (CLARITIN) 10 MG tablet, Take 1 tablet by mouth Daily., Disp: , Rfl:     metFORMIN (GLUCOPHAGE) 500 MG tablet, Take 1 tablet by mouth 2 (Two) Times a Day With Meals., Disp: 180 tablet, Rfl: 1    metoprolol succinate XL (TOPROL-XL) 25 MG 24 hr tablet, , Disp: , Rfl:     Multiple Vitamins-Minerals (EYE VITAMINS PO), Take 1 tablet by mouth 2 (Two) Times a Day., Disp: , Rfl:     multivitamin with minerals tablet tablet, Take  "1 tablet by mouth Daily., Disp: , Rfl:     sertraline (ZOLOFT) 50 MG tablet, Take 1 tablet by mouth Daily., Disp: 90 tablet, Rfl: 1    Testosterone Cypionate (DEPOTESTOTERONE CYPIONATE) 200 MG/ML injection, INJECT 1 MILLILITER INTRAMUSCULARLY ONCE EVERY 21 DAYS, Disp: 1 mL, Rfl: 0    traZODone (DESYREL) 100 MG tablet, , Disp: , Rfl:     Allergies:   No Known Allergies    Objective     Physical Exam:   Vital Signs:   Vitals:    10/11/23 0925   BP: 130/76   BP Location: Left arm   Patient Position: Sitting   Cuff Size: Adult   Weight: 104 kg (230 lb)   Height: 177.8 cm (70\")     Body mass index is 33 kg/mý.     Physical Exam  Constitutional: NAD, WDWN.   Neurological: A + O x 3.   Psychiatric:  Normal mood and affect    Genitourinary  Perineum:  No perineal pain with palpation  Rectal:  Normal tone, no masses  Prostate:  30 grams, symmetric, non-tender, anodular and no induration    Labs  Brief Urine Lab Results  (Last result in the past 365 days)        Color   Clarity   Blood   Leuk Est   Nitrite   Protein   CREAT   Urine HCG        04/16/23 1734 Yellow   Clear     Negative                       Lab Results   Component Value Date    GLUCOSE 101 (H) 10/06/2023    CALCIUM 9.5 10/06/2023     10/06/2023    K 4.1 10/06/2023    CO2 28.5 10/06/2023     10/06/2023    BUN 16 10/06/2023    CREATININE 0.89 10/06/2023    BCR 18.0 10/06/2023    ANIONGAP 11.5 10/06/2023       Lab Results   Component Value Date    WBC 8.75 10/06/2023    HGB 14.7 10/06/2023    HCT 43.2 10/06/2023    MCV 91.5 10/06/2023     10/06/2023            Radiographic Studies  FL Guided Injection Hip Right    Result Date: 8/8/2023  Successful intra-articular injection of the right hip. CRITICAL RESULT:   No. COMMUNICATION: Per this written report. Drafted by Bob Peralta MD on 8/8/2023 1:38 PM Final report signed by Bob Peralta MD on 8/8/2023 1:39 PM    XR Hip 1 View Without Pelvis Right (Surgery " Only)    Result Date: 8/1/2023  Severe osteoarthritis of the bilateral hip, right worse than left. CRITICAL RESULT:   No. COMMUNICATION: Per this written report. Drafted by Trenton Keenan MD on 8/1/2023 3:44 PM Final report signed by Trenton Keenan MD on 8/1/2023 3:46 PM      I have reviewed the above labs and imaging.     Assessment / Plan      Assessment/Plan:   Diagnoses and all orders for this visit:    1. Benign prostatic hyperplasia, unspecified whether lower urinary tract symptoms present (Primary)  -     PSA DIAGNOSTIC; Future     69 yo male with hypogonadism here for follow-up we reviewed hematocrit and hemoglobin are within normal limits, testosterone is on the elevated side this is likely due to injection timing and lab draw timing patient injected on Thursday and lab draw was Friday. He is feeling less fatigue on HRT will continue with this dosage until we reevaluate PSA.   We discussed the risk of testosterone worsening an undiagnosed prostate cancer, patient's PSA was mildly elevated at 4.150.  We will repeat PSA in 1 month and make the decision if patient needs to stop testosterone temporarily to see if PSA returns to a normal level.    Obtain PSA 1 month follow-up video visit afterwards  Continue testosterone 200 mg every 21 days  Obtain TT, hematocrit and hemoglobin 6 months    Follow Up:   Return in about 4 weeks (around 11/8/2023) for Video visit, Follow up Crispin.    MIRACLE Bliss,NP-C  [facility]

## 2023-10-19 ENCOUNTER — TELEPHONE (OUTPATIENT)
Dept: INTERNAL MEDICINE | Facility: CLINIC | Age: 68
End: 2023-10-19
Payer: MEDICARE

## 2023-10-19 RX ORDER — DOXYCYCLINE HYCLATE 100 MG/1
100 CAPSULE ORAL 2 TIMES DAILY
Qty: 20 CAPSULE | Refills: 0 | Status: SHIPPED | OUTPATIENT
Start: 2023-10-19 | End: 2023-10-29

## 2023-10-19 RX ORDER — LEVOCETIRIZINE DIHYDROCHLORIDE 5 MG/1
5 TABLET, FILM COATED ORAL EVERY EVENING
Qty: 90 TABLET | Refills: 1 | Status: SHIPPED | OUTPATIENT
Start: 2023-10-19

## 2023-10-19 NOTE — TELEPHONE ENCOUNTER
I sent a stronger antibiotic doxycycline for him to take BID x 10 days, used for chronic sinusitis, take with food, avoid any vitamins while taking this antibiotic  I am changing his allergy medication from claritin to xyzal, I also sent this to pharmacy. Take in the evening  Continue flonase, mucinex, saline rinses with netipot, warm compresses to sinuses  If he is still not getting better he will need to see an ENT and/or allergist.

## 2023-10-19 NOTE — TELEPHONE ENCOUNTER
"Caller: Harley Flynn \"Mohit\"    Relationship: Self    Best call back number:  [phone]     Who are you requesting to speak with (clinical staff, provider,  specific staff member):  CLINICAL       What was the call regarding: PATIENT HAS A COUGH, PRESSURE AND PAIN IN HIS SINUS   HE HAS HAD THESE SYMPTOMS FOR A COUPLE OF MONTHS   PATIENT SAID HE FINISHED THE ANTIBIOTIC  THAT WAS GIVEN TO HIM     NO APPOINTMENTS AVAILABLE WITH JESSY OAKLEY UNTIL 10-30-23    PLEASE ADVISE   "

## 2023-10-27 ENCOUNTER — LAB (OUTPATIENT)
Dept: LAB | Facility: HOSPITAL | Age: 68
End: 2023-10-27
Payer: MEDICARE

## 2023-10-27 DIAGNOSIS — N40.0 BENIGN PROSTATIC HYPERPLASIA, UNSPECIFIED WHETHER LOWER URINARY TRACT SYMPTOMS PRESENT: ICD-10-CM

## 2023-10-27 DIAGNOSIS — E29.1 HYPOGONADISM IN MALE: ICD-10-CM

## 2023-10-27 LAB
HCT VFR BLD AUTO: 42.6 % (ref 37.5–51)
HGB BLD-MCNC: 14.7 G/DL (ref 13–17.7)
PSA SERPL-MCNC: 3.71 NG/ML (ref 0–4)
TESTOST SERPL-MCNC: 134 NG/DL (ref 193–740)

## 2023-10-27 PROCEDURE — 85018 HEMOGLOBIN: CPT

## 2023-10-27 PROCEDURE — 85014 HEMATOCRIT: CPT

## 2023-10-27 PROCEDURE — 84153 ASSAY OF PSA TOTAL: CPT

## 2023-10-27 PROCEDURE — 84403 ASSAY OF TOTAL TESTOSTERONE: CPT

## 2023-10-27 PROCEDURE — 36415 COLL VENOUS BLD VENIPUNCTURE: CPT

## 2023-10-30 DIAGNOSIS — E29.1 HYPOGONADISM IN MALE: ICD-10-CM

## 2023-10-30 RX ORDER — TESTOSTERONE CYPIONATE 200 MG/ML
INJECTION, SOLUTION INTRAMUSCULAR
Qty: 1 ML | Refills: 0 | Status: SHIPPED | OUTPATIENT
Start: 2023-10-30

## 2023-11-08 ENCOUNTER — TELEMEDICINE (OUTPATIENT)
Dept: UROLOGY | Facility: CLINIC | Age: 68
End: 2023-11-08
Payer: MEDICARE

## 2023-11-08 DIAGNOSIS — E29.1 HYPOGONADISM IN MALE: Primary | ICD-10-CM

## 2023-11-08 NOTE — PROGRESS NOTES
Patient agreed to visit via video and was located at home in [place]    We reviewed repeat if PSA is down within a normal range it decreased to 3.710 I think it was likely elevated due to he took his testosterone injection that morning and had his labs drawn.  We will continue with testosterone injections and plan for patient to follow-up at his scheduled 6-month visit in April with repeat labs.  We will return to yearly PSA    Crispin RAUSCH, Np-C  [facility] [place]

## 2023-11-16 DIAGNOSIS — E29.1 HYPOGONADISM IN MALE: ICD-10-CM

## 2023-11-16 RX ORDER — TESTOSTERONE CYPIONATE 200 MG/ML
INJECTION, SOLUTION INTRAMUSCULAR
Qty: 1 ML | Refills: 0 | Status: SHIPPED | OUTPATIENT
Start: 2023-11-16

## 2023-12-06 DIAGNOSIS — E29.1 HYPOGONADISM IN MALE: ICD-10-CM

## 2023-12-06 RX ORDER — TESTOSTERONE CYPIONATE 200 MG/ML
INJECTION, SOLUTION INTRAMUSCULAR
Qty: 1 ML | Refills: 0 | Status: SHIPPED | OUTPATIENT
Start: 2023-12-06

## 2023-12-14 DIAGNOSIS — E78.5 HYPERLIPIDEMIA, UNSPECIFIED HYPERLIPIDEMIA TYPE: ICD-10-CM

## 2023-12-14 RX ORDER — AMLODIPINE BESYLATE 2.5 MG/1
2.5 TABLET ORAL DAILY
Qty: 90 TABLET | Refills: 3 | Status: SHIPPED | OUTPATIENT
Start: 2023-12-14

## 2023-12-14 RX ORDER — ATORVASTATIN CALCIUM 80 MG/1
80 TABLET, FILM COATED ORAL NIGHTLY
Qty: 90 TABLET | Refills: 3 | Status: SHIPPED | OUTPATIENT
Start: 2023-12-14

## 2023-12-26 DIAGNOSIS — E29.1 HYPOGONADISM IN MALE: ICD-10-CM

## 2023-12-26 RX ORDER — LISINOPRIL 40 MG/1
40 TABLET ORAL DAILY
Qty: 90 TABLET | Refills: 3 | Status: SHIPPED | OUTPATIENT
Start: 2023-12-26

## 2023-12-27 RX ORDER — TESTOSTERONE CYPIONATE 200 MG/ML
INJECTION, SOLUTION INTRAMUSCULAR
Qty: 1 ML | Refills: 0 | Status: SHIPPED | OUTPATIENT
Start: 2023-12-27

## 2024-01-18 DIAGNOSIS — E29.1 HYPOGONADISM IN MALE: ICD-10-CM

## 2024-01-18 RX ORDER — TESTOSTERONE CYPIONATE 200 MG/ML
INJECTION, SOLUTION INTRAMUSCULAR
Qty: 1 ML | Refills: 0 | Status: SHIPPED | OUTPATIENT
Start: 2024-01-18

## 2024-02-08 DIAGNOSIS — E29.1 HYPOGONADISM IN MALE: ICD-10-CM

## 2024-02-08 RX ORDER — TESTOSTERONE CYPIONATE 200 MG/ML
INJECTION, SOLUTION INTRAMUSCULAR
Qty: 1 ML | Refills: 0 | Status: SHIPPED | OUTPATIENT
Start: 2024-02-08

## 2024-02-20 ENCOUNTER — OFFICE VISIT (OUTPATIENT)
Dept: INTERNAL MEDICINE | Facility: CLINIC | Age: 69
End: 2024-02-20
Payer: MEDICARE

## 2024-02-20 VITALS
SYSTOLIC BLOOD PRESSURE: 126 MMHG | TEMPERATURE: 98.4 F | OXYGEN SATURATION: 97 % | HEIGHT: 70 IN | HEART RATE: 63 BPM | BODY MASS INDEX: 33.21 KG/M2 | DIASTOLIC BLOOD PRESSURE: 82 MMHG | WEIGHT: 232 LBS

## 2024-02-20 DIAGNOSIS — F41.9 ANXIETY AND DEPRESSION: ICD-10-CM

## 2024-02-20 DIAGNOSIS — J30.89 ENVIRONMENTAL AND SEASONAL ALLERGIES: ICD-10-CM

## 2024-02-20 DIAGNOSIS — F32.A ANXIETY AND DEPRESSION: ICD-10-CM

## 2024-02-20 DIAGNOSIS — I48.0 PAROXYSMAL A-FIB: ICD-10-CM

## 2024-02-20 DIAGNOSIS — R73.9 HYPERGLYCEMIA: ICD-10-CM

## 2024-02-20 DIAGNOSIS — I10 PRIMARY HYPERTENSION: Primary | ICD-10-CM

## 2024-02-20 DIAGNOSIS — E78.5 DYSLIPIDEMIA: ICD-10-CM

## 2024-02-20 DIAGNOSIS — Z11.59 ENCOUNTER FOR HEPATITIS C SCREENING TEST FOR LOW RISK PATIENT: ICD-10-CM

## 2024-02-20 DIAGNOSIS — Z29.11 NEED FOR RSV VACCINATION: ICD-10-CM

## 2024-02-20 RX ORDER — ZOSTER VACCINE RECOMBINANT, ADJUVANTED 50 MCG/0.5
0.5 KIT INTRAMUSCULAR ONCE
Qty: 1 EACH | Refills: 0 | Status: CANCELLED | OUTPATIENT
Start: 2024-02-20 | End: 2024-02-20

## 2024-02-20 RX ORDER — AMLODIPINE BESYLATE 2.5 MG/1
2.5 TABLET ORAL DAILY
Qty: 90 TABLET | Refills: 3 | Status: SHIPPED | OUTPATIENT
Start: 2024-02-20 | End: 2024-02-22 | Stop reason: SDUPTHER

## 2024-02-20 RX ORDER — ATORVASTATIN CALCIUM 80 MG/1
80 TABLET, FILM COATED ORAL NIGHTLY
Qty: 90 TABLET | Refills: 1 | Status: CANCELLED | OUTPATIENT
Start: 2024-02-20

## 2024-02-20 RX ORDER — LEVOCETIRIZINE DIHYDROCHLORIDE 5 MG/1
5 TABLET, FILM COATED ORAL EVERY EVENING
Qty: 90 TABLET | Refills: 1 | Status: SHIPPED | OUTPATIENT
Start: 2024-02-20 | End: 2024-02-20 | Stop reason: SDUPTHER

## 2024-02-20 RX ORDER — LISINOPRIL 40 MG/1
40 TABLET ORAL DAILY
Qty: 90 TABLET | Refills: 3 | Status: SHIPPED | OUTPATIENT
Start: 2024-02-20 | End: 2024-02-22 | Stop reason: SDUPTHER

## 2024-02-20 RX ORDER — FLUTICASONE PROPIONATE 50 MCG
2 SPRAY, SUSPENSION (ML) NASAL DAILY
Qty: 16 G | Refills: 12 | Status: SHIPPED | OUTPATIENT
Start: 2024-02-20 | End: 2024-02-22 | Stop reason: SDUPTHER

## 2024-02-20 RX ORDER — AMLODIPINE BESYLATE 2.5 MG/1
2.5 TABLET ORAL DAILY
Qty: 90 TABLET | Refills: 1 | Status: CANCELLED | OUTPATIENT
Start: 2024-02-20

## 2024-02-20 RX ORDER — ATORVASTATIN CALCIUM 80 MG/1
80 TABLET, FILM COATED ORAL NIGHTLY
Qty: 90 TABLET | Refills: 3 | Status: SHIPPED | OUTPATIENT
Start: 2024-02-20

## 2024-02-20 RX ORDER — DEUTETRABENAZINE 9 MG/1
18 TABLET, COATED ORAL 2 TIMES DAILY
COMMUNITY
Start: 2023-12-01

## 2024-02-20 RX ORDER — AZELASTINE 1 MG/ML
2 SPRAY, METERED NASAL 2 TIMES DAILY
Qty: 30 ML | Refills: 12 | Status: SHIPPED | OUTPATIENT
Start: 2024-02-20 | End: 2024-02-22 | Stop reason: SDUPTHER

## 2024-02-20 RX ORDER — AMLODIPINE BESYLATE 10 MG/1
10 TABLET ORAL DAILY
COMMUNITY

## 2024-02-20 RX ORDER — LEVOCETIRIZINE DIHYDROCHLORIDE 5 MG/1
5 TABLET, FILM COATED ORAL EVERY EVENING
Qty: 90 TABLET | Refills: 3 | Status: SHIPPED | OUTPATIENT
Start: 2024-02-20 | End: 2024-02-22 | Stop reason: SDUPTHER

## 2024-02-20 RX ORDER — LISINOPRIL 40 MG/1
40 TABLET ORAL DAILY
Qty: 90 TABLET | Refills: 1 | Status: CANCELLED | OUTPATIENT
Start: 2024-02-20

## 2024-02-20 NOTE — PROGRESS NOTES
Office Visit      Date: 2024   Patient Name: Harley Flynn  : 1955   MRN: [id number]     Chief Complaint:    Chief Complaint   Patient presents with   • Follow-up   • Anxiety   • Depression   • Hyperlipidemia   • Insomnia       History of Present Illness: Harley Flynn is a 68 y.o. male presents for follow up.  He is up to date with pneumonia, tetanus, flu, shingles. He is due RSV, COVID vaccine.   Had CT of his chest which shows a normal aorta in 2023.  He is up to date with colonoscopy, lipid panel  Follows cardiologist. He has hypertension, history of atrial fibrillation. HLD. Conditions are stable. Taking medications as prescribed. He is on anticoagulatoin with eliquis. Denies chest pain, shortness of breath, tachycardia, palpitations, headache, visual changes.   He is up to date with dental and eye exams.   Anxiety and depression are stable on sertraline. Sleeping well with trazodone. [facility]   Compliant with CPAP for RAFY.  Hyperglycemia; last A1c was 6.3%. He does drink coffee in the morning, and 2 diet sodas a day. States he likes potatoes and carbs.   Low testosterone treated by urology.   He follows orthopedics, with Dr. Maikel Zuniga. Uses cane for ambulation assistance.   Rhinitis has improved on xyzal and flonase, no sinus tenderness, fever, chills, cough, sore throat, but still have congestion and rhinorrhea that runs down his throat constantly     Subjective      Review of Systems:   Pertinent ROS noted in HPI.     I have reviewed the patients family history, social history, past medical history, past surgical history and have updated it as appropriate.     Medications:     Current Outpatient Medications:   •  acetaminophen (TYLENOL) 500 MG tablet, , Disp: , Rfl:   •  amLODIPine (NORVASC) 10 MG tablet, Take 1 tablet by mouth Daily., Disp: , Rfl:   •  amLODIPine (NORVASC) 2.5 MG tablet, Take 1 tablet by mouth Daily.,  Disp: 90 tablet, Rfl: 3  •  apixaban (Eliquis) 5 MG tablet tablet, Take 1 tablet by mouth 2 (Two) Times a Day., Disp: 180 tablet, Rfl: 1  •  ARIPiprazole (ABILIFY) 5 MG tablet, , Disp: , Rfl:   •  aspirin 81 MG oral suspension, Take 81 mL by mouth 1 (One) Time., Disp: , Rfl:   •  atorvastatin (LIPITOR) 80 MG tablet, Take 1 tablet by mouth Every Night., Disp: 90 tablet, Rfl: 3  •  Austedo 9 MG tablet, Take 18 mg by mouth 2 (Two) Times a Day., Disp: , Rfl:   •  chlorthalidone (HYGROTON) 25 MG tablet, Take 1 tablet by mouth Daily., Disp: , Rfl:   •  fluticasone (FLONASE) 50 MCG/ACT nasal spray, 2 sprays into the nostril(s) as directed by provider Daily., Disp: 16 g, Rfl: 12  •  hydrOXYzine pamoate (VISTARIL) 100 MG capsule, , Disp: , Rfl:   •  levocetirizine (XYZAL) 5 MG tablet, Take 1 tablet by mouth Every Evening., Disp: 90 tablet, Rfl: 3  •  lisinopril (PRINIVIL,ZESTRIL) 40 MG tablet, Take 1 tablet by mouth Daily., Disp: 90 tablet, Rfl: 3  •  metFORMIN (GLUCOPHAGE) 500 MG tablet, Take 1 tablet by mouth 2 (Two) Times a Day With Meals., Disp: 180 tablet, Rfl: 3  •  metoprolol succinate XL (TOPROL-XL) 25 MG 24 hr tablet, , Disp: , Rfl:   •  Multiple Vitamins-Minerals (EYE VITAMINS PO), Take 1 tablet by mouth 2 (Two) Times a Day., Disp: , Rfl:   •  multivitamin with minerals tablet tablet, Take 1 tablet by mouth Daily., Disp: , Rfl:   •  sertraline (ZOLOFT) 50 MG tablet, Take 1 tablet by mouth Daily., Disp: 90 tablet, Rfl: 3  •  Testosterone Cypionate (DEPOTESTOTERONE CYPIONATE) 200 MG/ML injection, INJECT 1 MILLILITER INTRAMUSCULARLY ONCE EVERY 21 DAYS, Disp: 1 mL, Rfl: 0  •  traZODone (DESYREL) 100 MG tablet, , Disp: , Rfl:   •  azelastine (ASTELIN) 0.1 % nasal spray, 2 sprays into the nostril(s) as directed by provider 2 (Two) Times a Day. Use in each nostril as directed, Disp: 30 mL, Rfl: 12  •  RSVPreF3 Vac Recomb Adjuvanted (AREXVY) 120 MCG/0.5ML reconstituted suspension injection, Inject 0.5 mL into the  appropriate muscle as directed by prescriber 1 (One) Time for 1 dose., Disp: 0.5 mL, Rfl: 0    Allergies:   No Known Allergies    Objective     Physical Exam  Physical Exam  Vitals and nursing note reviewed.   Constitutional:       General: He is not in acute distress.     Appearance: Normal appearance. He is obese.   HENT:      Head: Normocephalic and atraumatic.      Right Ear: External ear normal.      Left Ear: External ear normal.      Nose: Nose normal. Congestion and rhinorrhea present.      Mouth/Throat:      Lips: Pink.      Mouth: Mucous membranes are moist.      Pharynx: Oropharynx is clear.   Eyes:      General: No scleral icterus.     Extraocular Movements: Extraocular movements intact.      Conjunctiva/sclera: Conjunctivae normal.      Pupils: Pupils are equal, round, and reactive to light.   Neck:      Thyroid: No thyromegaly.      Vascular: No carotid bruit.      Trachea: Phonation normal.   Cardiovascular:      Rate and Rhythm: Normal rate and regular rhythm.      Pulses: Normal pulses.   Pulmonary:      Effort: Pulmonary effort is normal.      Breath sounds: Normal breath sounds.   Abdominal:      General: Abdomen is flat. Bowel sounds are normal. There is no distension.      Palpations: Abdomen is soft.      Tenderness: There is no abdominal tenderness. There is no guarding.   Musculoskeletal:         General: Deformity present.      Cervical back: Normal range of motion and neck supple.      Right lower leg: No edema.      Left lower leg: No edema.      Comments: Using cane for ambulation assistance    Lymphadenopathy:      Cervical: No cervical adenopathy.   Skin:     General: Skin is warm and dry.      Coloration: Skin is not jaundiced or pale.      Findings: No rash.   Neurological:      Mental Status: He is alert and oriented to person, place, and time.      Cranial Nerves: No cranial nerve deficit.      Motor: No weakness.      Coordination: Coordination normal.      Gait: Gait abnormal.  "  Psychiatric:         Mood and Affect: Mood normal.         Behavior: Behavior normal.         Thought Content: Thought content normal.         Vital Signs:   Vitals:    02/20/24 1046   BP: 126/82   Pulse: 63   Temp: 98.4 °F (36.9 °C)   SpO2: 97%   Weight: 105 kg (232 lb)   Height: 177.8 cm (70\")     Body mass index is 33.29 kg/m².    Assessment / Plan      Assessment/Plan:   Diagnoses and all orders for this visit:    1. Primary hypertension (Primary)  -     CBC No Differential  -     Comprehensive Metabolic Panel  -     Hemoglobin A1c  -     TSH Rfx On Abnormal To Free T4  -     amLODIPine (NORVASC) 2.5 MG tablet; Take 1 tablet by mouth Daily.  Dispense: 90 tablet; Refill: 3  -     lisinopril (PRINIVIL,ZESTRIL) 40 MG tablet; Take 1 tablet by mouth Daily.  Dispense: 90 tablet; Refill: 3  - Stable. Continue current medications as prescribed. Recommend DASH diet, 30 minutes of exercise 5 times/week, medication compliance, and home blood pressure monitoring.     2. Paroxysmal A-fib  -     TSH Rfx On Abnormal To Free T4  -     apixaban (Eliquis) 5 MG tablet tablet; Take 1 tablet by mouth 2 (Two) Times a Day.  Dispense: 180 tablet; Refill: 1  - Stable. Continue medication and anticoagulation. Follow up cardiology.     3. Dyslipidemia  -     TSH Rfx On Abnormal To Free T4  -     atorvastatin (LIPITOR) 80 MG tablet; Take 1 tablet by mouth Every Night.  Dispense: 90 tablet; Refill: 3   - Lipid panel utd, stable. Continue statin nightly.     4. Hyperglycemia  -     Comprehensive Metabolic Panel  -     Hemoglobin A1c  -     metFORMIN (GLUCOPHAGE) 500 MG tablet; Take 1 tablet by mouth 2 (Two) Times a Day With Meals.  Dispense: 180 tablet; Refill: 3  - Update A1c and fasting glucose. Diet and regular exercise recommended.     5. Anxiety and depression  -     TSH Rfx On Abnormal To Free T4  -     sertraline (ZOLOFT) 50 MG tablet; Take 1 tablet by mouth Daily.  Dispense: 90 tablet; Refill: 3  - Stable on Zoloft. Continue as " directed.     6. Environmental and seasonal allergies  -     azelastine (ASTELIN) 0.1 % nasal spray; 2 sprays into the nostril(s) as directed by provider 2 (Two) Times a Day. Use in each nostril as directed  Dispense: 30 mL; Refill: 12  -     fluticasone (FLONASE) 50 MCG/ACT nasal spray; 2 sprays into the nostril(s) as directed by provider Daily.  Dispense: 16 g; Refill: 12  -     levocetirizine (XYZAL) 5 MG tablet; Take 1 tablet by mouth Every Evening.  Dispense: 90 tablet; Refill: 3  -     Ambulatory Referral to Allergy  - Uncontrolled, but improved on Xyzal. Add Azelastine nasal spray to regimen. Referral to allergist for further evaluation.     7. Encounter for hepatitis C screening test for low risk patient  -     Hepatitis C antibody    8. Need for RSV vaccination  -     RSVPreF3 Vac Recomb Adjuvanted (AREXVY) 120 MCG/0.5ML reconstituted suspension injection; Inject 0.5 mL into the appropriate muscle as directed by prescriber 1 (One) Time for 1 dose.  Dispense: 0.5 mL; Refill: 0  - Script printed. Obtain at pharmacy.       Patient has been erroneously marked as diabetic. Based on the available clinical information, he does not have diabetes and should therefore be excluded from diabetic health maintenance and quality measures for the remainder of the reporting period.    Follow Up:   Return in about 6 months (around 8/20/2024) for Medicare Wellness.      Marielle RAUSCH  [facility]

## 2024-02-22 DIAGNOSIS — I48.0 PAROXYSMAL A-FIB: ICD-10-CM

## 2024-02-22 DIAGNOSIS — F32.A ANXIETY AND DEPRESSION: ICD-10-CM

## 2024-02-22 DIAGNOSIS — I10 PRIMARY HYPERTENSION: ICD-10-CM

## 2024-02-22 DIAGNOSIS — R73.9 HYPERGLYCEMIA: ICD-10-CM

## 2024-02-22 DIAGNOSIS — F41.9 ANXIETY AND DEPRESSION: ICD-10-CM

## 2024-02-22 DIAGNOSIS — J30.89 ENVIRONMENTAL AND SEASONAL ALLERGIES: ICD-10-CM

## 2024-02-22 RX ORDER — LEVOCETIRIZINE DIHYDROCHLORIDE 5 MG/1
5 TABLET, FILM COATED ORAL EVERY EVENING
Qty: 90 TABLET | Refills: 3 | Status: SHIPPED | OUTPATIENT
Start: 2024-02-22

## 2024-02-22 RX ORDER — FLUTICASONE PROPIONATE 50 MCG
2 SPRAY, SUSPENSION (ML) NASAL DAILY
Qty: 16 G | Refills: 12 | Status: SHIPPED | OUTPATIENT
Start: 2024-02-22

## 2024-02-22 RX ORDER — LISINOPRIL 40 MG/1
40 TABLET ORAL DAILY
Qty: 90 TABLET | Refills: 3 | Status: SHIPPED | OUTPATIENT
Start: 2024-02-22

## 2024-02-22 RX ORDER — AMLODIPINE BESYLATE 2.5 MG/1
2.5 TABLET ORAL DAILY
Qty: 90 TABLET | Refills: 3 | Status: SHIPPED | OUTPATIENT
Start: 2024-02-22

## 2024-02-22 RX ORDER — AZELASTINE 1 MG/ML
2 SPRAY, METERED NASAL 2 TIMES DAILY
Qty: 30 ML | Refills: 12 | Status: SHIPPED | OUTPATIENT
Start: 2024-02-22

## 2024-02-29 DIAGNOSIS — E29.1 HYPOGONADISM IN MALE: ICD-10-CM

## 2024-02-29 RX ORDER — TESTOSTERONE CYPIONATE 200 MG/ML
INJECTION, SOLUTION INTRAMUSCULAR
Qty: 1 ML | Refills: 0 | Status: SHIPPED | OUTPATIENT
Start: 2024-02-29

## 2024-03-21 DIAGNOSIS — E29.1 HYPOGONADISM IN MALE: ICD-10-CM

## 2024-03-21 RX ORDER — TESTOSTERONE CYPIONATE 200 MG/ML
INJECTION, SOLUTION INTRAMUSCULAR
Qty: 1 ML | Refills: 0 | Status: SHIPPED | OUTPATIENT
Start: 2024-03-21

## 2024-04-10 ENCOUNTER — TELEPHONE (OUTPATIENT)
Dept: UROLOGY | Facility: CLINIC | Age: 69
End: 2024-04-10
Payer: MEDICARE

## 2024-04-12 LAB
ALBUMIN SERPL-MCNC: 4.4 G/DL (ref 3.5–5.2)
ALBUMIN/GLOB SERPL: 1.8 G/DL
ALP SERPL-CCNC: 94 U/L (ref 39–117)
ALT SERPL-CCNC: 17 U/L (ref 1–41)
AST SERPL-CCNC: 15 U/L (ref 1–40)
BILIRUB SERPL-MCNC: 0.6 MG/DL (ref 0–1.2)
BUN SERPL-MCNC: 15 MG/DL (ref 8–23)
BUN/CREAT SERPL: 16.3 (ref 7–25)
CALCIUM SERPL-MCNC: 9.4 MG/DL (ref 8.6–10.5)
CHLORIDE SERPL-SCNC: 102 MMOL/L (ref 98–107)
CO2 SERPL-SCNC: 27.6 MMOL/L (ref 22–29)
CREAT SERPL-MCNC: 0.92 MG/DL (ref 0.76–1.27)
EGFRCR SERPLBLD CKD-EPI 2021: 89.5 ML/MIN/1.73
ERYTHROCYTE [DISTWIDTH] IN BLOOD BY AUTOMATED COUNT: 13.2 % (ref 12.3–15.4)
GLOBULIN SER CALC-MCNC: 2.4 GM/DL
GLUCOSE SERPL-MCNC: 98 MG/DL (ref 65–99)
HBA1C MFR BLD: 6 % (ref 4.8–5.6)
HCT VFR BLD AUTO: 45.4 % (ref 37.5–51)
HCT VFR BLD AUTO: 47.3 % (ref 37.5–51)
HCV IGG SERPL QL IA: NON REACTIVE
HGB BLD-MCNC: 15.2 G/DL (ref 13–17.7)
HGB BLD-MCNC: 15.3 G/DL (ref 13–17.7)
MCH RBC QN AUTO: 32 PG (ref 26.6–33)
MCHC RBC AUTO-ENTMCNC: 33.5 G/DL (ref 31.5–35.7)
MCV RBC AUTO: 95.6 FL (ref 79–97)
PLATELET # BLD AUTO: 373 10*3/MM3 (ref 140–450)
POTASSIUM SERPL-SCNC: 4 MMOL/L (ref 3.5–5.2)
PROT SERPL-MCNC: 6.8 G/DL (ref 6–8.5)
PSA SERPL-MCNC: 5.43 NG/ML (ref 0–4)
RBC # BLD AUTO: 4.75 10*6/MM3 (ref 4.14–5.8)
SODIUM SERPL-SCNC: 143 MMOL/L (ref 136–145)
TESTOST SERPL-MCNC: 185 NG/DL (ref 264–916)
TSH SERPL DL<=0.005 MIU/L-ACNC: 1.84 UIU/ML (ref 0.27–4.2)
WBC # BLD AUTO: 11.06 10*3/MM3 (ref 3.4–10.8)

## 2024-04-15 DIAGNOSIS — E29.1 HYPOGONADISM IN MALE: ICD-10-CM

## 2024-04-17 RX ORDER — TESTOSTERONE CYPIONATE 200 MG/ML
INJECTION, SOLUTION INTRAMUSCULAR
Qty: 1 ML | Refills: 0 | Status: SHIPPED | OUTPATIENT
Start: 2024-04-17 | End: 2024-04-18 | Stop reason: SDUPTHER

## 2024-04-18 ENCOUNTER — TELEPHONE (OUTPATIENT)
Dept: UROLOGY | Facility: CLINIC | Age: 69
End: 2024-04-18

## 2024-04-18 DIAGNOSIS — E29.1 HYPOGONADISM IN MALE: ICD-10-CM

## 2024-04-18 RX ORDER — TESTOSTERONE CYPIONATE 200 MG/ML
200 INJECTION, SOLUTION INTRAMUSCULAR
Qty: 1 ML | Refills: 0 | Status: SHIPPED | OUTPATIENT
Start: 2024-04-18

## 2024-04-18 NOTE — TELEPHONE ENCOUNTER
Provider: LEANDER BUENO    Caller: LIZ KNOWLES    Relationship to Patient: SELF    Reason for Call: PT CALLED TO CHECK ON STATUS OF TESTOSTERONE REFILL.    PHARMACY IS ADVISING THEY HAVE NOT RECVD IT.  REVIEWED PRESCRIPT FROM 4/17/24 - CORRECT PHARMACY HAS BEEN LISTED.    PLEASE RESEND.

## 2024-04-19 ENCOUNTER — OFFICE VISIT (OUTPATIENT)
Dept: UROLOGY | Facility: CLINIC | Age: 69
End: 2024-04-19
Payer: MEDICARE

## 2024-04-19 VITALS
WEIGHT: 232 LBS | TEMPERATURE: 97.3 F | BODY MASS INDEX: 33.21 KG/M2 | DIASTOLIC BLOOD PRESSURE: 80 MMHG | RESPIRATION RATE: 17 BRPM | HEART RATE: 76 BPM | SYSTOLIC BLOOD PRESSURE: 142 MMHG | HEIGHT: 70 IN | OXYGEN SATURATION: 96 %

## 2024-04-19 DIAGNOSIS — E29.1 HYPOGONADISM IN MALE: ICD-10-CM

## 2024-04-19 DIAGNOSIS — R97.20 ELEVATED PROSTATE SPECIFIC ANTIGEN (PSA): Primary | ICD-10-CM

## 2024-04-19 RX ORDER — DESLORATADINE 5 MG/1
5 TABLET, ORALLY DISINTEGRATING ORAL DAILY
COMMUNITY

## 2024-04-19 RX ORDER — IPRATROPIUM BROMIDE 21 UG/1
2 SPRAY, METERED NASAL EVERY 12 HOURS
COMMUNITY

## 2024-04-19 NOTE — PROGRESS NOTES
Office Visit Established Male Patient     Patient Name: Harley Flynn  : 1955   MRN: [id number]     Chief Complaint:   Chief Complaint   Patient presents with    Benign Prostatic Hypertrophy    Hypogonadism       History of Present Illness: Mr. Harley Flynn is a 68 y.o. male who presents today for follow up with hypogonadism.  Patient reports he is having trouble with [facility] getting his prescription they tell him he has not they have not received anything from our office and he does not have refills on his prescription.      Subjective      Review of System:   As noted in HPI    Past Medical History:   Past Medical History:   Diagnosis Date    Anxiety and depression     Arthritis     Chronic pain     states needs a new right knee replacement (replaced in )    Constipation     Deep vein thrombosis 2023    Stroke from blood clot    Elevated cholesterol     Erectile dysfunction 2017    History of nuclear stress test 2016    pt states was WNL    Hypertension     Neuromuscular disorder 1999    RSD since .    Resolved itself earlier     PONV (postoperative nausea and vomiting)     Seasonal allergies     Sleep apnea     Stroke 04/15/2023    Visual impairment 04/15/2023    Blurry & right peripheral    Wears glasses        Past Surgical History:   Past Surgical History:   Procedure Laterality Date    APPENDECTOMY      CARPAL TUNNEL RELEASE Right 2022    Procedure: RIGHT CARPAL TUNNEL RELEASE AND RIGHT CUBITAL TUNNEL RELEASE;  Surgeon: Caleb Crisitna MD;  Location: [facility];  Service: Orthopedics;  Laterality: Right;    COLONOSCOPY      JOINT REPLACEMENT  3023    Revision of 1999 replacement    REPLACEMENT TOTAL KNEE Right     WISDOM TOOTH EXTRACTION         Family History:   Family History   Problem Relation Age of Onset    Diabetes Mother     Hypertension Mother     Mental illness Mother     Hyperlipidemia Father        Social History:   Social History      Socioeconomic History    Marital status:    Tobacco Use    Smoking status: Former     Current packs/day: 0.00     Average packs/day: 2.0 packs/day for 25.0 years (50.0 ttl pk-yrs)     Types: Cigarettes     Start date: 1966     Quit date: 1991     Years since quittin.3     Passive exposure: Never    Smokeless tobacco: Never    Tobacco comments:     Smoked since age 11   Vaping Use    Vaping status: Never Used   Substance and Sexual Activity    Alcohol use: Not Currently     Comment: Nothing for 30+ years.   Prior, casual drinking    Drug use: Never    Sexual activity: Not Currently     Partners: Female     Birth control/protection: Abstinence     Comment: Only partner is wife of 41 yes but i have ED now       Medications:     Current Outpatient Medications:     acetaminophen (TYLENOL) 500 MG tablet, , Disp: , Rfl:     amLODIPine (NORVASC) 10 MG tablet, Take 1 tablet by mouth Daily., Disp: , Rfl:     amLODIPine (NORVASC) 2.5 MG tablet, Take 1 tablet by mouth Daily., Disp: 90 tablet, Rfl: 3    apixaban (Eliquis) 5 MG tablet tablet, Take 1 tablet by mouth 2 (Two) Times a Day., Disp: 180 tablet, Rfl: 1    ARIPiprazole (ABILIFY) 5 MG tablet, , Disp: , Rfl:     aspirin 81 MG oral suspension, Take 81 mL by mouth 1 (One) Time., Disp: , Rfl:     atorvastatin (LIPITOR) 80 MG tablet, Take 1 tablet by mouth Every Night., Disp: 90 tablet, Rfl: 3    azelastine (ASTELIN) 0.1 % nasal spray, 2 sprays into the nostril(s) as directed by provider 2 (Two) Times a Day. Use in each nostril as directed, Disp: 30 mL, Rfl: 12    chlorthalidone (HYGROTON) 25 MG tablet, Take 1 tablet by mouth Daily., Disp: , Rfl:     desloratadine (CLARINEX REDITAB) 5 MG disintegrating tablet, Place 1 tablet on the tongue Daily., Disp: , Rfl:     hydrOXYzine pamoate (VISTARIL) 100 MG capsule, , Disp: , Rfl:     ipratropium (ATROVENT) 0.03 % nasal spray, 2 sprays into the nostril(s) as directed by provider Every 12 (Twelve) Hours., Disp:  ", Rfl:     lisinopril (PRINIVIL,ZESTRIL) 40 MG tablet, Take 1 tablet by mouth Daily., Disp: 90 tablet, Rfl: 3    metFORMIN (GLUCOPHAGE) 500 MG tablet, Take 1 tablet by mouth 2 (Two) Times a Day With Meals., Disp: 180 tablet, Rfl: 3    metoprolol succinate XL (TOPROL-XL) 25 MG 24 hr tablet, , Disp: , Rfl:     Multiple Vitamins-Minerals (EYE VITAMINS PO), Take 1 tablet by mouth 2 (Two) Times a Day., Disp: , Rfl:     multivitamin with minerals tablet tablet, Take 1 tablet by mouth Daily., Disp: , Rfl:     sertraline (ZOLOFT) 50 MG tablet, Take 1 tablet by mouth Daily., Disp: 90 tablet, Rfl: 3    Testosterone Cypionate (DEPOTESTOTERONE CYPIONATE) 200 MG/ML injection, Inject 1 mL into the appropriate muscle as directed by prescriber Every 21 (Twenty-One) Days., Disp: 1 mL, Rfl: 0    traZODone (DESYREL) 100 MG tablet, , Disp: , Rfl:     Austedo 9 MG tablet, Take 18 mg by mouth 2 (Two) Times a Day., Disp: , Rfl:     fluticasone (FLONASE) 50 MCG/ACT nasal spray, 2 sprays into the nostril(s) as directed by provider Daily., Disp: 16 g, Rfl: 12    levocetirizine (XYZAL) 5 MG tablet, Take 1 tablet by mouth Every Evening., Disp: 90 tablet, Rfl: 3    Allergies:   No Known Allergies    Objective     Physical Exam:   Vital Signs:   Vitals:    04/19/24 0936   BP: 142/80   BP Location: Left arm   Patient Position: Sitting   Cuff Size: Adult   Pulse: 76   Resp: 17   Temp: 97.3 °F (36.3 °C)   TempSrc: Temporal   SpO2: 96%   Weight: 105 kg (232 lb)   Height: 177.8 cm (70\")     Body mass index is 33.29 kg/m².     Physical Exam  Constitutional: NAD, WDWN.   Neurological: A + O x 3.   Psychiatric:  Normal mood and affect      Labs  Brief Urine Lab Results       None            Lab Results   Component Value Date    GLUCOSE 98 04/11/2024    CALCIUM 9.4 04/11/2024     04/11/2024    K 4.0 04/11/2024    CO2 27.6 04/11/2024     04/11/2024    BUN 15 04/11/2024    CREATININE 0.92 04/11/2024    EGFRIFAFRI 81 11/22/2023    BCR 16.3 " 04/11/2024    ANIONGAP 11.5 10/06/2023       Lab Results   Component Value Date    WBC 11.06 (H) 04/11/2024    HGB 15.3 04/11/2024    HCT 47.3 04/11/2024    MCV 95.6 04/11/2024     04/11/2024            Radiographic Studies  MRI BRAIN W WO CONTRAST    Result Date: 1/2/2024  Evolution of the chronic infarct at the left temporal lobe. There is a mild amount of curvilinear enhancement in the posterior aspect of the infarct where the hematoma was previously present. While enhancement from a chronic infarct or hematoma is not typical, granulation tissue could enhance. Follow-up imaging could be considered for further characterization and evaluate for other lesions. CRITICAL RESULT: No. COMMUNICATION: Per this written report. Drafted by Deyanira Ruffin MD on 1/2/2024 11:44 AM Final report signed by Deyanira Ruffin MD on 1/2/2024 12:17 PM      I have reviewed the above labs and imaging.     Assessment / Plan      Assessment/Plan:   Diagnoses and all orders for this visit:    1. Elevated prostate specific antigen (PSA) (Primary)    2. Hypogonadism in male    68-year-old male with a history of hypogonadism and oscillating PSA here for 6-month follow-up.  He is having no complications with injections overall feels well on testosterone therapy.  Labs reveal testosterone was low due to injection timing, hematocrit and hemoglobin were within normal limits, and PSA has bumped up and elevated mildly.  We discussed the risk of testosterone therapy is a worsening undiagnosed prostate cancer with his PSA levels oscillating I recommend 4K score to help determine if patient should proceed with testosterone therapy and move onto prostate MRI or prostate biopsy.    4K score next week  Will continue testosterone 200 mg every 21 days at this time and further evaluate with 4K score results  Anticipate labs 6 months TT, hematocrit and hemoglobin 6 months    Follow Up:   Return in about 3 weeks (around 5/10/2024) for Follow up [place], [place]  visit.    MIRACLE Bliss,NP-C  [facility]

## 2024-04-23 ENCOUNTER — LAB (OUTPATIENT)
Dept: UROLOGY | Facility: CLINIC | Age: 69
End: 2024-04-23
Payer: MEDICARE

## 2024-05-10 ENCOUNTER — TELEMEDICINE (OUTPATIENT)
Dept: UROLOGY | Facility: CLINIC | Age: 69
End: 2024-05-10
Payer: MEDICARE

## 2024-05-10 DIAGNOSIS — R97.20 ELEVATED PROSTATE SPECIFIC ANTIGEN (PSA): Primary | ICD-10-CM

## 2024-05-13 DIAGNOSIS — E78.5 DYSLIPIDEMIA: ICD-10-CM

## 2024-05-13 RX ORDER — ATORVASTATIN CALCIUM 80 MG/1
80 TABLET, FILM COATED ORAL NIGHTLY
Qty: 90 TABLET | Refills: 3 | Status: SHIPPED | OUTPATIENT
Start: 2024-05-13

## 2024-05-13 RX ORDER — AMLODIPINE BESYLATE 10 MG/1
10 TABLET ORAL DAILY
Status: CANCELLED | OUTPATIENT
Start: 2024-05-13

## 2024-05-17 RX ORDER — AMLODIPINE BESYLATE 10 MG/1
10 TABLET ORAL DAILY
Qty: 90 TABLET | Refills: 3 | Status: SHIPPED | OUTPATIENT
Start: 2024-05-17

## 2024-05-28 RX ORDER — CHLORTHALIDONE 25 MG/1
25 TABLET ORAL DAILY
Qty: 90 TABLET | Refills: 3 | Status: SHIPPED | OUTPATIENT
Start: 2024-05-28

## 2024-05-28 NOTE — TELEPHONE ENCOUNTER
Rx Refill Note  Requested Prescriptions     Pending Prescriptions Disp Refills    chlorthalidone (HYGROTON) 25 MG tablet 90 tablet 3     Sig: Take 1 tablet by mouth Daily.      Last office visit with prescribing clinician: 2/20/2024   Last telemedicine visit with prescribing clinician: Visit date not found   Next office visit with prescribing clinician: 8/20/2024                         Ginny Velazquez MA  05/28/24, 14:21 EDT

## 2024-05-31 ENCOUNTER — HOSPITAL ENCOUNTER (OUTPATIENT)
Dept: MRI IMAGING | Facility: HOSPITAL | Age: 69
Discharge: HOME | End: 2024-05-31
Payer: MEDICARE

## 2024-05-31 DIAGNOSIS — R97.20 ELEVATED PROSTATE SPECIFIC ANTIGEN (PSA): ICD-10-CM

## 2024-06-14 ENCOUNTER — HOSPITAL ENCOUNTER (OUTPATIENT)
Dept: MRI IMAGING | Facility: HOSPITAL | Age: 69
Discharge: HOME | End: 2024-06-14
Payer: MEDICARE

## 2024-06-14 ENCOUNTER — HOSPITAL ENCOUNTER (OUTPATIENT)
Dept: GENERAL RADIOLOGY | Facility: HOSPITAL | Age: 69
Discharge: HOME | End: 2024-06-14
Payer: MEDICARE

## 2024-06-14 DIAGNOSIS — R97.20 ELEVATED PROSTATE SPECIFIC ANTIGEN (PSA): ICD-10-CM

## 2024-06-14 PROCEDURE — 70250 X-RAY EXAM OF SKULL: CPT

## 2024-06-14 PROCEDURE — A9577 INJ MULTIHANCE: HCPCS | Performed by: NURSE PRACTITIONER

## 2024-06-14 PROCEDURE — 82565 ASSAY OF CREATININE: CPT

## 2024-06-14 PROCEDURE — 0 GADOBENATE DIMEGLUMINE 529 MG/ML SOLUTION: Performed by: NURSE PRACTITIONER

## 2024-06-14 PROCEDURE — 72197 MRI PELVIS W/O & W/DYE: CPT

## 2024-06-14 RX ADMIN — GADOBENATE DIMEGLUMINE 20 ML: 529 INJECTION, SOLUTION INTRAVENOUS at 10:56

## 2024-06-15 LAB — CREAT BLDA-MCNC: 1 MG/DL (ref 0.6–1.3)

## 2024-07-08 ENCOUNTER — OFFICE VISIT (OUTPATIENT)
Dept: UROLOGY | Facility: CLINIC | Age: 69
End: 2024-07-08
Payer: MEDICARE

## 2024-07-08 VITALS
SYSTOLIC BLOOD PRESSURE: 128 MMHG | WEIGHT: 233 LBS | HEIGHT: 70 IN | DIASTOLIC BLOOD PRESSURE: 78 MMHG | BODY MASS INDEX: 33.36 KG/M2

## 2024-07-08 DIAGNOSIS — R97.20 ELEVATED PSA: Primary | ICD-10-CM

## 2024-07-08 PROCEDURE — 1159F MED LIST DOCD IN RCRD: CPT | Performed by: UROLOGY

## 2024-07-08 PROCEDURE — 1160F RVW MEDS BY RX/DR IN RCRD: CPT | Performed by: UROLOGY

## 2024-07-08 PROCEDURE — 99214 OFFICE O/P EST MOD 30 MIN: CPT | Performed by: UROLOGY

## 2024-07-08 RX ORDER — CIPROFLOXACIN 500 MG/1
500 TABLET, FILM COATED ORAL 2 TIMES DAILY
Qty: 6 TABLET | Refills: 0 | Status: SHIPPED | OUTPATIENT
Start: 2024-07-08

## 2024-07-08 RX ORDER — SERTRALINE HYDROCHLORIDE 100 MG/1
100 TABLET, FILM COATED ORAL
COMMUNITY

## 2024-07-08 RX ORDER — MELOXICAM 15 MG/1
15 TABLET ORAL DAILY
COMMUNITY
Start: 2024-06-18 | End: 2024-07-18

## 2024-07-08 RX ORDER — DEUTETRABENAZINE 24 MG/1
36 TABLET, FILM COATED, EXTENDED RELEASE ORAL
COMMUNITY
Start: 2024-04-01

## 2024-07-08 RX ORDER — MAGNESIUM HYDROXIDE 1200 MG/15ML
1 LIQUID ORAL ONCE
Qty: 1 ENEMA | Refills: 0 | Status: SHIPPED | OUTPATIENT
Start: 2024-07-08 | End: 2024-07-08

## 2024-07-08 RX ORDER — DEUTETRABENAZINE 12 MG/1
36 TABLET, FILM COATED, EXTENDED RELEASE ORAL
COMMUNITY
Start: 2024-04-01

## 2024-07-08 RX ORDER — PSEUDOEPHEDRINE HCL 30 MG
250 TABLET ORAL
COMMUNITY
Start: 2024-06-18 | End: 2024-07-18

## 2024-07-08 NOTE — PROGRESS NOTES
CC  Elevated PSA  Hypogonadism    HPI  Mr. Flynn is a 68 y.o. male with history below in assessment, who presents for follow up.       Past Medical History:   Diagnosis Date    Anxiety and depression     Arthritis     Chronic pain     states needs a new right knee replacement (replaced in 1999)    Constipation     Deep vein thrombosis 04/16/2023    Stroke from blood clot    Elevated cholesterol     Erectile dysfunction 01/01/2017    History of nuclear stress test 2016    pt states was WNL    Hypertension     Neuromuscular disorder 12/01/1999    RSD since 1999.    Resolved itself earlier 2023    PONV (postoperative nausea and vomiting)     Seasonal allergies     Sleep apnea     Stroke 04/15/2023    Visual impairment 04/15/2023    Blurry & right peripheral    Wears glasses        Past Surgical History:   Procedure Laterality Date    APPENDECTOMY      CARPAL TUNNEL RELEASE Right 11/18/2022    Procedure: RIGHT CARPAL TUNNEL RELEASE AND RIGHT CUBITAL TUNNEL RELEASE;  Surgeon: Caleb Cristina MD;  Location: [facility];  Service: Orthopedics;  Laterality: Right;    COLONOSCOPY      HIP BIPOLAR REPLACEMENT Right 06/17/2024    JOINT REPLACEMENT  04/12/3023    Revision of 1999 replacement    REPLACEMENT TOTAL KNEE Right     WISDOM TOOTH EXTRACTION           Current Outpatient Medications:     Austedo XR 12 MG tablet sustained-release 24 hour, Take 36 mg by mouth., Disp: , Rfl:     Austedo XR 24 MG tablet sustained-release 24 hour, Take 36 mg by mouth., Disp: , Rfl:     docusate sodium 250 MG capsule, Take 250 mg by mouth., Disp: , Rfl:     meloxicam (MOBIC) 15 MG tablet, Take 1 tablet by mouth Daily., Disp: , Rfl:     acetaminophen (TYLENOL) 500 MG tablet, , Disp: , Rfl:     amLODIPine (NORVASC) 10 MG tablet, Take 1 tablet by mouth Daily., Disp: 90 tablet, Rfl: 3    amLODIPine (NORVASC) 2.5 MG tablet, Take 1 tablet by mouth Daily., Disp: 90 tablet, Rfl: 3    apixaban (Eliquis) 5 MG tablet tablet, Take 1 tablet by mouth 2  "(Two) Times a Day., Disp: 180 tablet, Rfl: 1    ARIPiprazole (ABILIFY) 5 MG tablet, , Disp: , Rfl:     atorvastatin (LIPITOR) 80 MG tablet, Take 1 tablet by mouth Every Night., Disp: 90 tablet, Rfl: 3    azelastine (ASTELIN) 0.1 % nasal spray, 2 sprays into the nostril(s) as directed by provider 2 (Two) Times a Day. Use in each nostril as directed, Disp: 30 mL, Rfl: 12    chlorthalidone (HYGROTON) 25 MG tablet, Take 1 tablet by mouth Daily., Disp: 90 tablet, Rfl: 3    desloratadine (CLARINEX REDITAB) 5 MG disintegrating tablet, Place 1 tablet on the tongue Daily., Disp: , Rfl:     hydrOXYzine pamoate (VISTARIL) 100 MG capsule, , Disp: , Rfl:     ipratropium (ATROVENT) 0.03 % nasal spray, 2 sprays into the nostril(s) as directed by provider Every 12 (Twelve) Hours., Disp: , Rfl:     lisinopril (PRINIVIL,ZESTRIL) 40 MG tablet, Take 1 tablet by mouth Daily., Disp: 90 tablet, Rfl: 3    metFORMIN (GLUCOPHAGE) 500 MG tablet, Take 1 tablet by mouth 2 (Two) Times a Day With Meals., Disp: 180 tablet, Rfl: 3    metoprolol succinate XL (TOPROL-XL) 25 MG 24 hr tablet, , Disp: , Rfl:     Multiple Vitamins-Minerals (EYE VITAMINS PO), Take 1 tablet by mouth 2 (Two) Times a Day., Disp: , Rfl:     multivitamin with minerals tablet tablet, Take 1 tablet by mouth Daily., Disp: , Rfl:     sertraline (ZOLOFT) 100 MG tablet, Take 1 tablet by mouth., Disp: , Rfl:     Testosterone Cypionate (DEPOTESTOTERONE CYPIONATE) 200 MG/ML injection, Inject 1 mL into the appropriate muscle as directed by prescriber Every 21 (Twenty-One) Days., Disp: 1 mL, Rfl: 0    traZODone (DESYREL) 100 MG tablet, , Disp: , Rfl:      Physical Exam  Visit Vitals  /78 (BP Location: Right arm, Patient Position: Sitting, Cuff Size: Adult)   Ht 177.8 cm (70\")   Wt 106 kg (233 lb)   BMI 33.43 kg/m²       Labs  Brief Urine Lab Results       None            Lab Results   Component Value Date    GLUCOSE 180 (H) 06/17/2024    CALCIUM 9.4 04/11/2024     04/11/2024 "    K 4.1 06/17/2024    CO2 27.6 04/11/2024     06/17/2024    BUN 15 04/11/2024    CREATININE 1.00 06/14/2024    EGFRIFAFRI 81 11/22/2023    BCR 16.3 04/11/2024    ANIONGAP 11.5 10/06/2023       Lab Results   Component Value Date    WBC 14.37 (H) 06/18/2024    HGB 10.9 (L) 06/18/2024    HCT 32.3 (L) 06/18/2024    MCV 95 06/18/2024     06/18/2024            Lab Results   Component Value Date    PSA 5.430 (H) 04/11/2024    PSA 3.710 10/27/2023    PSA 4.150 (H) 10/06/2023       Lab Results   Component Value Date    TESTFRE 2.4 (L) 05/15/2023    TESTOSTEROTT 185 (L) 04/11/2024        Radiographic Studies  MRI Prostate With & Without Contrast  Result Date: 6/18/2024  Impression: 1. Left peripheral zone 8 mm posterolateral mid gland PI-RADS 3 lesion. 2. No pelvic lymphadenopathy. 3. No findings of ANA. 4. Midline utricle cyst measures 2.1 x 1.7 cm. 5. Prostate segmentation and lesion marking performed on [employer]. 6. Severe right hip osteoarthritis. Overall PI-RADS 3 exam. Electronically Signed: Brian Jauregui MD  6/18/2024 8:57 PM EDT  Workstation ID: [id number]        I have reviewed above labs and imaging.     Assessment  68 y.o. male with hypogonadism, on chronic TRT, elevated PSA of 5.4 in April prompted a 4K score result, with PSA in that testing being 8.04, and risk of high-grade prostate cancer being over 50%.  MRI was performed and shows right mid gland PI-RADS 3 lesion.    I counseled the patient that for me to feel comfortable prescribing testosterone for him again I would recommend prostate biopsy.  We discussed the risks and benefits of this.    History of DVT, afib, CVA on chronic Eliquis. Stopped it for his hip replacement 3 weeks ago.     Plan  1.  Schedule for prostate biopsy in 3 mo, stop Eliquis 3 days prior.  2.  Rectal culture performed, prophylactic antibiotics and enema prescribed.

## 2024-07-12 LAB
BACTERIA SPEC CULT: ABNORMAL
MICROORGANISM/AGENT SPEC: ABNORMAL
OTHER ANTIBIOTIC SUSC ISLT: ABNORMAL

## 2024-07-13 DIAGNOSIS — I48.0 PAROXYSMAL A-FIB: ICD-10-CM

## 2024-07-15 RX ORDER — APIXABAN 5 MG/1
5 TABLET, FILM COATED ORAL 2 TIMES DAILY
Qty: 180 TABLET | Refills: 1 | OUTPATIENT
Start: 2024-07-15

## 2024-08-19 ENCOUNTER — PROCEDURE VISIT (OUTPATIENT)
Dept: UROLOGY | Facility: CLINIC | Age: 69
End: 2024-08-19
Payer: MEDICARE

## 2024-08-19 DIAGNOSIS — R97.20 ELEVATED PSA: Primary | ICD-10-CM

## 2024-08-19 PROCEDURE — 55700 PR PROSTATE NEEDLE BIOPSY ANY APPROACH: CPT | Performed by: UROLOGY

## 2024-08-19 PROCEDURE — 76942 ECHO GUIDE FOR BIOPSY: CPT | Performed by: UROLOGY

## 2024-08-19 NOTE — PROGRESS NOTES
TRUS BIOPSY OF PROSTATE    Preoperative diagnosis  Elevated PSA    Postoperative diagnosis  Elevated PSA    Procedure  1.  Transrectal ultrasound of the prostate  2.  Transrectal ultrasound guidance of needle biopsy  3.  Prostate biopsy    Attending Surgeon  Flavio Sparks MD    Anesthesia  2% lidocaine solution, periprostatic injection, 10mL    Complications  None    Specimen  Prostate biopsy x 17    Indications  Mr. Flynn is a 68 y.o. year old male with an elevated PSA:   Lab Results   Component Value Date    PSA 5.430 (H) 04/11/2024    PSA 3.710 10/27/2023    PSA 4.150 (H) 10/06/2023       MRI Prostate With & Without Contrast  Result Date: 6/18/2024  Impression: 1. Left peripheral zone 8 mm posterolateral mid gland PI-RADS 3 lesion. 2. No pelvic lymphadenopathy. 3. No findings of ANA. 4. Midline utricle cyst measures 2.1 x 1.7 cm. 5. Prostate segmentation and lesion marking performed on [employer]. 6. Severe right hip osteoarthritis. Overall PI-RADS 3 exam. Electronically Signed: Brian Jauregui MD  6/18/2024 8:57 PM EDT  Workstation ID: [id number]    Prostate size: 4.8 [CC] x 4.1 [AP] x 5.9 [transverse] cm for an overall volume of 60.7 cc.  PSAD = 0.12     After discussing his options, the patient decided to proceed with prostate biopsy.  Informed consent was obtained. Possible complications were discussed with the patient during his last visit including, but not limited to, hematuria, hematochezia, prostatitis, urinary tract infection, sepsis, and urinary retention.  He did start the prescribed oral antibiotic regimen.    Procedure  The patient was positioned and prepped in a left lateral position with lower extremities flexed.  Lidocaine jelly, 2%, was injected per rectum. A digital rectal exam was performed.The rectal ultrasound probe was slowly introduced into the rectum without difficulty.  The prostate and seminal vesicles were inspected systematically using cross and sagittal views with the ultrasound.  The  dimensions of the prostate were measured.  Using a true cut 14 Fr biopsy needle, 17 prostate cores were collected. The specific locations were the following: left lateral base, left lateral mid, left lateral apex, left medial base, left medial mid, and left medial apex, right lateral base, right lateral mid, right lateral apex, right medial base, right medial mid, right medial apex, and right and left transition zones. The rectal ultrasound probe was removed.  A TAMIKA was again performed revealing little blood in the rectum.  The patient tolerated the procedure well.    Unfortunately the patient did have a vagal response and passed out after the procedure.  He was able to be awakened immediately though, and was responsive.  He was given fluids with sugar in them to drink, and was observed for 30 minutes, without any ill effects.    Plan  1.  The patient was instructed to drink plenty of fluids and warned about possible complications and side effects including, but not limited to, blood in the urine, stool and semen as well as bloodstream infection.  He was instructed to call the office if there are any issues, especially fevers or flu-like symptoms.    2.  Continue antibiotic for a total of 3 days.  3.  The patient will return to clinic in approximately 1 week for discussion of the pathological report.

## 2024-08-20 ENCOUNTER — OFFICE VISIT (OUTPATIENT)
Dept: INTERNAL MEDICINE | Facility: CLINIC | Age: 69
End: 2024-08-20
Payer: MEDICARE

## 2024-08-20 VITALS
WEIGHT: 242 LBS | DIASTOLIC BLOOD PRESSURE: 72 MMHG | HEART RATE: 78 BPM | TEMPERATURE: 97.6 F | OXYGEN SATURATION: 98 % | SYSTOLIC BLOOD PRESSURE: 124 MMHG | HEIGHT: 70 IN | BODY MASS INDEX: 34.65 KG/M2

## 2024-08-20 DIAGNOSIS — E78.5 DYSLIPIDEMIA: ICD-10-CM

## 2024-08-20 DIAGNOSIS — I48.0 PAROXYSMAL A-FIB: ICD-10-CM

## 2024-08-20 DIAGNOSIS — R73.9 HYPERGLYCEMIA: ICD-10-CM

## 2024-08-20 DIAGNOSIS — Z00.00 ANNUAL PHYSICAL EXAM: Primary | ICD-10-CM

## 2024-08-20 DIAGNOSIS — F41.9 ANXIETY AND DEPRESSION: ICD-10-CM

## 2024-08-20 DIAGNOSIS — Z00.00 MEDICARE ANNUAL WELLNESS VISIT, SUBSEQUENT: ICD-10-CM

## 2024-08-20 DIAGNOSIS — I10 PRIMARY HYPERTENSION: ICD-10-CM

## 2024-08-20 DIAGNOSIS — G47.33 OSA (OBSTRUCTIVE SLEEP APNEA): ICD-10-CM

## 2024-08-20 DIAGNOSIS — R42 VERTIGO: ICD-10-CM

## 2024-08-20 DIAGNOSIS — F51.01 PRIMARY INSOMNIA: ICD-10-CM

## 2024-08-20 DIAGNOSIS — F32.A ANXIETY AND DEPRESSION: ICD-10-CM

## 2024-08-20 DIAGNOSIS — J30.89 ENVIRONMENTAL AND SEASONAL ALLERGIES: ICD-10-CM

## 2024-08-20 PROCEDURE — 1125F AMNT PAIN NOTED PAIN PRSNT: CPT | Performed by: NURSE PRACTITIONER

## 2024-08-20 PROCEDURE — 1159F MED LIST DOCD IN RCRD: CPT | Performed by: NURSE PRACTITIONER

## 2024-08-20 PROCEDURE — 3074F SYST BP LT 130 MM HG: CPT | Performed by: NURSE PRACTITIONER

## 2024-08-20 PROCEDURE — 3044F HG A1C LEVEL LT 7.0%: CPT | Performed by: NURSE PRACTITIONER

## 2024-08-20 PROCEDURE — 3078F DIAST BP <80 MM HG: CPT | Performed by: NURSE PRACTITIONER

## 2024-08-20 PROCEDURE — G0439 PPPS, SUBSEQ VISIT: HCPCS | Performed by: NURSE PRACTITIONER

## 2024-08-20 PROCEDURE — 1170F FXNL STATUS ASSESSED: CPT | Performed by: NURSE PRACTITIONER

## 2024-08-20 PROCEDURE — 99213 OFFICE O/P EST LOW 20 MIN: CPT | Performed by: NURSE PRACTITIONER

## 2024-08-20 PROCEDURE — 99397 PER PM REEVAL EST PAT 65+ YR: CPT | Performed by: NURSE PRACTITIONER

## 2024-08-20 PROCEDURE — 1160F RVW MEDS BY RX/DR IN RCRD: CPT | Performed by: NURSE PRACTITIONER

## 2024-08-20 RX ORDER — DESLORATADINE 5 MG/1
5 TABLET, ORALLY DISINTEGRATING ORAL DAILY
Qty: 90 TABLET | Refills: 3 | Status: SHIPPED | OUTPATIENT
Start: 2024-08-20

## 2024-08-20 RX ORDER — IPRATROPIUM BROMIDE 21 UG/1
2 SPRAY, METERED NASAL EVERY 12 HOURS
Qty: 30 ML | Refills: 12 | Status: SHIPPED | OUTPATIENT
Start: 2024-08-20

## 2024-08-20 NOTE — PROGRESS NOTES
Subjective     Medicare Wellness Visit      Harley Flynn is a 68 y.o. patient who presents for a Medicare Wellness Visit.  Vaccines and screenings reviewed. Says he is due for AAA screen, but he does have a CT of his chest, which shows a normal aorta in January 2023.  He is taking his medications as prescribed. Follows cardiologist. He has hypertension, history of atrial fibrillation. HLD. Conditions are stable. Taking medications as prescribed. He is on anticoagulatoin with eliquis. Denies chest pain, shortness of breath, tachycardia, palpitations, headache, visual changes. He is up to date with dental and eye exams. He has problems being active due to chronic pain, follows ortho. Uses cane for ambulation. Anxiety and depression are stable on sertraline. Sleeping well with trazodone. Compliant with CPAP. Allergies are better, saw allergist and medications were changed. Hyperglycemia; last A1c was 6% stable. Low testosterone treated by urology, they performed a prostate biopsy yesterday to r/o malignancy.    The following portions of the patient's history were reviewed and   updated as appropriate: allergies, current medications, past family history, past medical history, past social history, past surgical history, and problem list.    Compared to one year ago, the patient's physical   health is better.  Compared to one year ago, the patient's mental   health is the same.    Recent Hospitalizations:  He was admitted within the past 365 days for hip surgery in June.     Current Medical Providers:  Patient Care Team:  Marielle Yin APRN as PCP - General (Family Medicine)    Outpatient Medications Prior to Visit   Medication Sig Dispense Refill    acetaminophen (TYLENOL) 500 MG tablet       amLODIPine (NORVASC) 10 MG tablet Take 1 tablet by mouth Daily. 90 tablet 3    amLODIPine (NORVASC) 2.5 MG tablet Take 1 tablet by mouth Daily. 90 tablet 3    apixaban (Eliquis) 5 MG tablet tablet Take 1 tablet by mouth  2 (Two) Times a Day. 180 tablet 1    ARIPiprazole (ABILIFY) 5 MG tablet       atorvastatin (LIPITOR) 80 MG tablet Take 1 tablet by mouth Every Night. 90 tablet 3    Austedo XR 12 MG tablet sustained-release 24 hour Take 36 mg by mouth.      Austedo XR 24 MG tablet sustained-release 24 hour Take 36 mg by mouth.      azelastine (ASTELIN) 0.1 % nasal spray 2 sprays into the nostril(s) as directed by provider 2 (Two) Times a Day. Use in each nostril as directed 30 mL 12    chlorthalidone (HYGROTON) 25 MG tablet Take 1 tablet by mouth Daily. 90 tablet 3    ciprofloxacin (Cipro) 500 MG tablet Take 1 tablet by mouth 2 (Two) Times a Day. Start taking the day prior to biopsy 6 tablet 0    hydrOXYzine pamoate (VISTARIL) 100 MG capsule       lisinopril (PRINIVIL,ZESTRIL) 40 MG tablet Take 1 tablet by mouth Daily. 90 tablet 3    metFORMIN (GLUCOPHAGE) 500 MG tablet Take 1 tablet by mouth 2 (Two) Times a Day With Meals. 180 tablet 3    metoprolol succinate XL (TOPROL-XL) 25 MG 24 hr tablet       Multiple Vitamins-Minerals (EYE VITAMINS PO) Take 1 tablet by mouth 2 (Two) Times a Day.      multivitamin with minerals tablet tablet Take 1 tablet by mouth Daily.      sertraline (ZOLOFT) 100 MG tablet Take 1 tablet by mouth.      Testosterone Cypionate (DEPOTESTOTERONE CYPIONATE) 200 MG/ML injection Inject 1 mL into the appropriate muscle as directed by prescriber Every 21 (Twenty-One) Days. 1 mL 0    traZODone (DESYREL) 100 MG tablet       desloratadine (CLARINEX REDITAB) 5 MG disintegrating tablet Place 1 tablet on the tongue Daily.      ipratropium (ATROVENT) 0.03 % nasal spray 2 sprays into the nostril(s) as directed by provider Every 12 (Twelve) Hours.       No facility-administered medications prior to visit.     No opioid medication identified on active medication list. I have reviewed chart for other potential  high risk medication/s and harmful drug interactions in the elderly.      Aspirin is not on active medication list.   "Aspirin use is not indicated based on review of current medical condition/s. Risk of harm outweighs potential benefits.  .    Patient Active Problem List   Diagnosis    Dyslipidemia    Paroxysmal A-fib    Anxiety and depression    Primary insomnia    RAFY (obstructive sleep apnea)    Environmental and seasonal allergies    Hyperglycemia    Primary hypertension     Advance Care Planning Advance Directive is on file.  ACP discussion was held with the patient during this visit. Patient has an advance directive in EMR which is still valid.       Objective   Vitals:    24 0943   BP: 124/72   Pulse: 78   Temp: 97.6 °F (36.4 °C)   SpO2: 98%   Weight: 110 kg (242 lb)   Height: 177.8 cm (70\")   PainSc:   4   PainLoc: Hip       Estimated body mass index is 34.72 kg/m² as calculated from the following:    Height as of this encounter: 177.8 cm (70\").    Weight as of this encounter: 110 kg (242 lb).    BMI is >= 30 and <35. (Class 1 Obesity). The following options were offered after discussion;: information on healthy weight added to patient's after visit summary        Does the patient have evidence of cognitive impairment? No  Lab Results   Component Value Date    HGBA1C 6.0 (H) 2024                                                                                         Health  Risk Assessment    Smoking Status:  Social History     Tobacco Use   Smoking Status Former    Current packs/day: 0.00    Average packs/day: 2.0 packs/day for 25.0 years (50.0 ttl pk-yrs)    Types: Cigarettes    Start date: 1966    Quit date: 1991    Years since quittin.6    Passive exposure: Never   Smokeless Tobacco Never   Tobacco Comments    Smoked since age 11     Alcohol Consumption:  Social History     Substance and Sexual Activity   Alcohol Use Not Currently    Comment: Nothing for 30+ years.   Prior, casual drinking       Fall Risk Screen  STEADI Fall Risk Assessment was completed, and patient is at MODERATE risk for " falls. Assessment completed on:2024    Depression Screenin/20/2024     9:51 AM   PHQ-2/PHQ-9 Depression Screening   Little Interest or Pleasure in Doing Things 0-->not at all   Feeling Down, Depressed or Hopeless 0-->not at all   PHQ-9: Brief Depression Severity Measure Score 0     Health Habits and Functional and Cognitive Screenin/20/2024     9:48 AM   Functional & Cognitive Status   Do you have difficulty preparing food and eating? No   Do you have difficulty bathing yourself, getting dressed or grooming yourself? No   Do you have difficulty using the toilet? No   Do you have difficulty moving around from place to place? Yes   Do you have trouble with steps or getting out of a bed or a chair? Yes   Current Diet Unhealthy Diet   Dental Exam Up to date   Eye Exam Up to date   Exercise (times per week) 5 times per week   Do you need help using the phone?  No   Are you deaf or do you have serious difficulty hearing?  No   Do you need help to go to places out of walking distance? No   Do you need help shopping? Yes   Do you need help preparing meals?  No   Do you need help with housework?  No   Do you need help with laundry? No   Do you need help taking your medications? Yes   Do you need help managing money? Yes   Do you ever drive or ride in a car without wearing a seat belt? No   Have you felt unusual stress, anger or loneliness in the last month? No   Who do you live with? Spouse   If you need help, do you have trouble finding someone available to you? No   Have you been bothered in the last four weeks by sexual problems? No   Do you have difficulty concentrating, remembering or making decisions? Yes           Age-appropriate Screening Schedule:  Refer to the list below for future screening recommendations based on patient's age, sex and/or medical conditions. Orders for these recommended tests are listed in the plan section. The patient has been provided with a written plan.    Health  Maintenance List  Health Maintenance   Topic Date Due    ANNUAL WELLNESS VISIT  08/18/2024    BMI FOLLOWUP  08/18/2024    COVID-19 Vaccine (5 - 2023-24 season) 11/09/2024 (Originally 9/1/2023)    INFLUENZA VACCINE  03/31/2025 (Originally 8/1/2024)    TDAP/TD VACCINES (2 - Td or Tdap) 08/25/2025 (Originally 3/20/2024)    LIPID PANEL  10/06/2024    COLORECTAL CANCER SCREENING  10/01/2031    HEPATITIS C SCREENING  Completed    Pneumococcal Vaccine 65+  Completed    AAA SCREEN (ONE-TIME)  Completed    ZOSTER VACCINE  Completed                                                                                                                                                  Risk Factors Identified During Encounter  Fall Risk-High or Moderate: Discussed Fall Prevention in the home  Immunizations Discussed/Encouraged: Tdap, Influenza, COVID19, and RSV (Respiratory Syncytial Virus)  Inactivity/Sedentary: Patient was advised to exercise at least 150 minutes a week per CDC recommendations.  Dental Screening Recommended  Vision Screening Recommended    The above risks/problems have been discussed with the patient.  Pertinent information has been shared with the patient in the After Visit Summary.  An After Visit Summary and PPPS were made available to the patient.    Follow Up:  Next Medicare Wellness visit to be scheduled in 1 year.         Additional E&M Note during same encounter follows:  Patient has additional, significant, and separately identifiable condition(s)/problem(s) that require work above and beyond the Medicare Wellness Visit     Chief Complaint  Annual Exam and Medicare Wellness-subsequent    Subjective   HPI  Mohit is also being seen today for an annual adult preventative physical exam.  and Mohit is also being seen today for additional medical problem/s.  Room spinning sensation when standing, not always but sometimes. Going over for 2 months. Not staying hydrated per wife.     Objective   Vital Signs:  BP  "124/72   Pulse 78   Temp 97.6 °F (36.4 °C)   Ht 177.8 cm (70\")   Wt 110 kg (242 lb)   SpO2 98%   BMI 34.72 kg/m²   Physical Exam  Vitals and nursing note reviewed.   Constitutional:       General: He is not in acute distress.     Appearance: Normal appearance. He is obese. He is not toxic-appearing or diaphoretic.   HENT:      Head: Normocephalic and atraumatic.      Right Ear: Ear canal and external ear normal. A middle ear effusion is present. Tympanic membrane is bulging.      Left Ear: Tympanic membrane, ear canal and external ear normal.      Ears:      Comments: Cerumen debris right ear canal     Nose: Nose normal.      Mouth/Throat:      Mouth: Mucous membranes are moist.      Pharynx: Oropharynx is clear.   Eyes:      General: No scleral icterus.     Extraocular Movements: Extraocular movements intact.      Conjunctiva/sclera: Conjunctivae normal.      Pupils: Pupils are equal, round, and reactive to light.   Neck:      Thyroid: No thyromegaly.      Vascular: No carotid bruit.   Cardiovascular:      Rate and Rhythm: Normal rate and regular rhythm.      Pulses: Normal pulses.           Dorsalis pedis pulses are 2+ on the right side and 2+ on the left side.        Posterior tibial pulses are 2+ on the right side and 2+ on the left side.      Heart sounds: Normal heart sounds.   Pulmonary:      Effort: Pulmonary effort is normal.      Breath sounds: Normal breath sounds.   Abdominal:      General: Abdomen is flat. Bowel sounds are normal. There is no distension.      Palpations: Abdomen is soft. There is no mass.      Tenderness: There is no abdominal tenderness. There is no guarding or rebound.      Hernia: No hernia is present.   Musculoskeletal:         General: Normal range of motion.      Cervical back: Normal range of motion and neck supple. No tenderness.      Right lower leg: No edema.      Left lower leg: No edema.   Lymphadenopathy:      Cervical: No cervical adenopathy.   Skin:     General: " Skin is warm and dry.      Findings: No rash.   Neurological:      General: No focal deficit present.      Mental Status: He is alert and oriented to person, place, and time. Mental status is at baseline.      Cranial Nerves: No cranial nerve deficit.      Sensory: No sensory deficit.      Motor: No weakness.      Coordination: Coordination normal.      Gait: Gait normal.   Psychiatric:         Mood and Affect: Mood normal.         Behavior: Behavior normal.         Thought Content: Thought content normal.         Judgment: Judgment normal.           Assessment and Plan   Diagnoses and all orders for this visit:    1. Annual physical exam (Primary)  -     CBC No Differential  -     Comprehensive Metabolic Panel  -     Lipid Panel    2. Medicare annual wellness visit, subsequent    3. Primary hypertension  -     CBC No Differential  -     Comprehensive Metabolic Panel  -     Lipid Panel  - Stable. Continue current medications as prescribed. Recommend DASH diet, 30 minutes of exercise 5 times/week, medication compliance, and home blood pressure monitoring.     4. Dyslipidemia  -     CBC No Differential  -     Comprehensive Metabolic Panel  -     Lipid Panel  - Update lipid panel, follow low cholesterol diet, exercise as tolerated     5. Paroxysmal A-fib  -     CBC No Differential  -     Comprehensive Metabolic Panel  -     Lipid Panel  - Stable, continue eliquis and cardiac meds, follow up cardiology as scheduled     6. Environmental and seasonal allergies  -     CBC No Differential  -     Comprehensive Metabolic Panel  -     Lipid Panel  -     desloratadine (CLARINEX REDITAB) 5 MG disintegrating tablet; Place 1 tablet on the tongue Daily.  Dispense: 90 tablet; Refill: 3  -     ipratropium (ATROVENT) 0.03 % nasal spray; 2 sprays into the nostril(s) as directed by provider Every 12 (Twelve) Hours.  Dispense: 30 mL; Refill: 12  - Had allergy testing; dust, trees, grasses nothing severe enough for allergy injections.  Changed meds asking for refill. Congestion is better.     7. Hyperglycemia  -     CBC No Differential  -     Comprehensive Metabolic Panel  -     Lipid Panel  - A1c stable 6% continue metformin, eliminate sugars in diet, exercise     8. Anxiety and depression  -     CBC No Differential  -     Comprehensive Metabolic Panel  -     Lipid Panel  - Stable    9. Primary insomnia  -     CBC No Differential  -     Comprehensive Metabolic Panel  -     Lipid Panel  - Stable    10. RAFY (obstructive sleep apnea)  -     CBC No Differential  -     Comprehensive Metabolic Panel  - Stable, continue cpap use     11. Vertigo  -     Right TM with effusion, cerumen debris. Use debrox for cerumen debris. Discussed hyperinflation exercises for ears. Epley maneuver with assistance from wife at home every 24 hours prn  - If persists consider ENT referral and/or vestibular rehab  - Stay hydrated >64 fluid oz of water, change positions slowly upon sitting and standing. Monitor BP for orthostatic changes. Carotid arteries without bruit.         Orders Placed This Encounter   Procedures    CBC No Differential     Order Specific Question:   Release to patient     Answer:   Routine Release [[id number]]    Comprehensive Metabolic Panel     Order Specific Question:   Release to patient     Answer:   Routine Release [[id number]]    Lipid Panel     Order Specific Question:   Release to patient     Answer:   Routine Release [[id number]]     New Medications Ordered This Visit   Medications    desloratadine (CLARINEX REDITAB) 5 MG disintegrating tablet     Sig: Place 1 tablet on the tongue Daily.     Dispense:  90 tablet     Refill:  3    ipratropium (ATROVENT) 0.03 % nasal spray     Si sprays into the nostril(s) as directed by provider Every 12 (Twelve) Hours.     Dispense:  30 mL     Refill:  12       Follow Up  6 Months   Patient was given instructions and counseling regarding his condition or for health maintenance advice. Please see specific  information pulled into the AVS if appropriate.      Marielle Yin, APRN

## 2024-08-21 LAB
ALBUMIN SERPL-MCNC: 4.1 G/DL (ref 3.5–5.2)
ALBUMIN/GLOB SERPL: 1.7 G/DL
ALP SERPL-CCNC: 129 U/L (ref 39–117)
ALT SERPL-CCNC: 21 U/L (ref 1–41)
AST SERPL-CCNC: 17 U/L (ref 1–40)
BILIRUB SERPL-MCNC: <0.2 MG/DL (ref 0–1.2)
BUN SERPL-MCNC: 21 MG/DL (ref 8–23)
BUN/CREAT SERPL: 22.6 (ref 7–25)
CALCIUM SERPL-MCNC: 9.4 MG/DL (ref 8.6–10.5)
CHLORIDE SERPL-SCNC: 102 MMOL/L (ref 98–107)
CHOLEST SERPL-MCNC: 119 MG/DL (ref 0–200)
CO2 SERPL-SCNC: 29.2 MMOL/L (ref 22–29)
CREAT SERPL-MCNC: 0.93 MG/DL (ref 0.76–1.27)
EGFRCR SERPLBLD CKD-EPI 2021: 88.3 ML/MIN/1.73
ERYTHROCYTE [DISTWIDTH] IN BLOOD BY AUTOMATED COUNT: 12.7 % (ref 12.3–15.4)
GLOBULIN SER CALC-MCNC: 2.4 GM/DL
GLUCOSE SERPL-MCNC: 80 MG/DL (ref 65–99)
HCT VFR BLD AUTO: 39 % (ref 37.5–51)
HDLC SERPL-MCNC: 56 MG/DL (ref 40–60)
HGB BLD-MCNC: 12.7 G/DL (ref 13–17.7)
LDLC SERPL CALC-MCNC: 50 MG/DL (ref 0–100)
MCH RBC QN AUTO: 30.8 PG (ref 26.6–33)
MCHC RBC AUTO-ENTMCNC: 32.6 G/DL (ref 31.5–35.7)
MCV RBC AUTO: 94.4 FL (ref 79–97)
PLATELET # BLD AUTO: 324 10*3/MM3 (ref 140–450)
POTASSIUM SERPL-SCNC: 4.3 MMOL/L (ref 3.5–5.2)
PROT SERPL-MCNC: 6.5 G/DL (ref 6–8.5)
RBC # BLD AUTO: 4.13 10*6/MM3 (ref 4.14–5.8)
REF LAB TEST METHOD: NORMAL
SODIUM SERPL-SCNC: 143 MMOL/L (ref 136–145)
TRIGL SERPL-MCNC: 59 MG/DL (ref 0–150)
VLDLC SERPL CALC-MCNC: 13 MG/DL (ref 5–40)
WBC # BLD AUTO: 8.14 10*3/MM3 (ref 3.4–10.8)

## 2024-08-26 ENCOUNTER — OFFICE VISIT (OUTPATIENT)
Dept: UROLOGY | Facility: CLINIC | Age: 69
End: 2024-08-26
Payer: MEDICARE

## 2024-08-26 VITALS — WEIGHT: 242 LBS | HEIGHT: 70 IN | BODY MASS INDEX: 34.65 KG/M2

## 2024-08-26 DIAGNOSIS — C61 PROSTATE CANCER: Primary | ICD-10-CM

## 2024-08-26 PROCEDURE — 1159F MED LIST DOCD IN RCRD: CPT | Performed by: UROLOGY

## 2024-08-26 PROCEDURE — 99213 OFFICE O/P EST LOW 20 MIN: CPT | Performed by: UROLOGY

## 2024-08-26 PROCEDURE — 1160F RVW MEDS BY RX/DR IN RCRD: CPT | Performed by: UROLOGY

## 2024-08-26 NOTE — PROGRESS NOTES
CC  PCa  Hypogonadism    HPI  Mr. Flynn is a 68 y.o. male with history below in assessment, who presents for follow up.     At this visit he says he feels ok off his TRT    Past Medical History:   Diagnosis Date    Anxiety and depression     Arthritis     Chronic pain     states needs a new right knee replacement (replaced in 1999)    Constipation     Deep vein thrombosis 04/16/2023    Stroke from blood clot    Elevated cholesterol     Erectile dysfunction 01/01/2017    History of nuclear stress test 2016    pt states was WNL    Hypertension     Neuromuscular disorder 12/01/1999    RSD since 1999.    Resolved itself earlier 2023    PONV (postoperative nausea and vomiting)     Seasonal allergies     Sleep apnea     Stroke 04/15/2023    Visual impairment 04/15/2023    Blurry & right peripheral    Wears glasses        Past Surgical History:   Procedure Laterality Date    APPENDECTOMY      CARPAL TUNNEL RELEASE Right 11/18/2022    Procedure: RIGHT CARPAL TUNNEL RELEASE AND RIGHT CUBITAL TUNNEL RELEASE;  Surgeon: Caleb Cristina MD;  Location: [facility];  Service: Orthopedics;  Laterality: Right;    COLONOSCOPY      HIP BIPOLAR REPLACEMENT Right 06/17/2024    JOINT REPLACEMENT  04/12/3023    Revision of 1999 replacement    REPLACEMENT TOTAL KNEE Right     WISDOM TOOTH EXTRACTION           Current Outpatient Medications:     acetaminophen (TYLENOL) 500 MG tablet, , Disp: , Rfl:     amLODIPine (NORVASC) 10 MG tablet, Take 1 tablet by mouth Daily., Disp: 90 tablet, Rfl: 3    amLODIPine (NORVASC) 2.5 MG tablet, Take 1 tablet by mouth Daily., Disp: 90 tablet, Rfl: 3    apixaban (Eliquis) 5 MG tablet tablet, Take 1 tablet by mouth 2 (Two) Times a Day., Disp: 180 tablet, Rfl: 1    ARIPiprazole (ABILIFY) 5 MG tablet, , Disp: , Rfl:     atorvastatin (LIPITOR) 80 MG tablet, Take 1 tablet by mouth Every Night., Disp: 90 tablet, Rfl: 3    Austedo XR 12 MG tablet sustained-release 24 hour, Take 36 mg by mouth., Disp: , Rfl:      "Austedo XR 24 MG tablet sustained-release 24 hour, Take 36 mg by mouth., Disp: , Rfl:     azelastine (ASTELIN) 0.1 % nasal spray, 2 sprays into the nostril(s) as directed by provider 2 (Two) Times a Day. Use in each nostril as directed, Disp: 30 mL, Rfl: 12    chlorthalidone (HYGROTON) 25 MG tablet, Take 1 tablet by mouth Daily., Disp: 90 tablet, Rfl: 3    ciprofloxacin (Cipro) 500 MG tablet, Take 1 tablet by mouth 2 (Two) Times a Day. Start taking the day prior to biopsy, Disp: 6 tablet, Rfl: 0    desloratadine (CLARINEX REDITAB) 5 MG disintegrating tablet, Place 1 tablet on the tongue Daily., Disp: 90 tablet, Rfl: 3    hydrOXYzine pamoate (VISTARIL) 100 MG capsule, , Disp: , Rfl:     ipratropium (ATROVENT) 0.03 % nasal spray, 2 sprays into the nostril(s) as directed by provider Every 12 (Twelve) Hours., Disp: 30 mL, Rfl: 12    lisinopril (PRINIVIL,ZESTRIL) 40 MG tablet, Take 1 tablet by mouth Daily., Disp: 90 tablet, Rfl: 3    metFORMIN (GLUCOPHAGE) 500 MG tablet, Take 1 tablet by mouth 2 (Two) Times a Day With Meals., Disp: 180 tablet, Rfl: 3    metoprolol succinate XL (TOPROL-XL) 25 MG 24 hr tablet, , Disp: , Rfl:     Multiple Vitamins-Minerals (EYE VITAMINS PO), Take 1 tablet by mouth 2 (Two) Times a Day., Disp: , Rfl:     multivitamin with minerals tablet tablet, Take 1 tablet by mouth Daily., Disp: , Rfl:     sertraline (ZOLOFT) 100 MG tablet, Take 1 tablet by mouth., Disp: , Rfl:     Testosterone Cypionate (DEPOTESTOTERONE CYPIONATE) 200 MG/ML injection, Inject 1 mL into the appropriate muscle as directed by prescriber Every 21 (Twenty-One) Days., Disp: 1 mL, Rfl: 0    traZODone (DESYREL) 100 MG tablet, , Disp: , Rfl:      Physical Exam  Visit Vitals  Ht 177.8 cm (70\")   Wt 110 kg (242 lb)   BMI 34.72 kg/m²       Labs  Brief Urine Lab Results       None            Lab Results   Component Value Date    GLUCOSE 80 08/20/2024    CALCIUM 9.4 08/20/2024     08/20/2024    K 4.3 08/20/2024    CO2 29.2 (H) " 08/20/2024     08/20/2024    BUN 21 08/20/2024    CREATININE 0.93 08/20/2024    EGFRIFAFRI 81 11/22/2023    BCR 22.6 08/20/2024    ANIONGAP 11.5 10/06/2023       Lab Results   Component Value Date    WBC 8.14 08/20/2024    HGB 12.7 (L) 08/20/2024    HCT 39.0 08/20/2024    MCV 94.4 08/20/2024     08/20/2024        Lab Results   Component Value Date    PSA 5.430 (H) 04/11/2024    PSA 3.710 10/27/2023    PSA 4.150 (H) 10/06/2023       Lab Results   Component Value Date    TESTFRE 2.4 (L) 05/15/2023    TESTOSTEROTT 185 (L) 04/11/2024        Radiographic Studies  MRI Prostate With & Without Contrast  Result Date: 6/18/2024  Impression: 1. Left peripheral zone 8 mm posterolateral mid gland PI-RADS 3 lesion. 2. No pelvic lymphadenopathy. 3. No findings of ANA. 4. Midline utricle cyst measures 2.1 x 1.7 cm. 5. Prostate segmentation and lesion marking performed on Bocom. 6. Severe right hip osteoarthritis. Overall PI-RADS 3 exam. Electronically Signed: Brian Jauregui MD  6/18/2024 8:57 PM EDT  Workstation ID: [id number]    RECEIVED          DATE REPORTED  08/20/2024 08/20/2024 08/21/2024    DIAGNOSIS:  A.     PROSTATE, NEEDLE CORE BIOPSY, RIGHT BASE:  Prostatic acinar adenocarcinoma  Ada's pattern: 3+3, grade Group 1  Carcinoma involves 1/2 cores, approximately 5% of total tissue volume  Negative for perineural invasion    B.     PROSTATE, NEEDLE CORE BIOPSY, RIGHT MID:  Prostatic acinar adenocarcinoma  Vienna's pattern: 3+3, grade Group 1  Carcinoma involves 1/2 cores, approximately 5% of total tissue volume  Negative for perineural invasion    C.     PROSTATE, NEEDLE CORE BIOPSY, RIGHT APEX:  Benign prostate tissue  Negative for malignancy    D.     PROSTATE, NEEDLE CORE BIOPSY, LEFT BASE:  Benign prostate tissue  Negative for malignancy    E.     PROSTATE, NEEDLE CORE BIOPSY, LEFT MID:  Prostatic acinar adenocarcinoma  Ada's pattern: 3+3, grade Group 1  Carcinoma involves 1/4  cores, less than 5% of total tissue volume  Negative for perineural invasion    F.     PROSTATE, NEEDLE CORE BIOPSY, LEFT APEX:  Benign prostate tissue  Negative for malignancy    G.     PROSTATE, NEEDLE CORE BIOPSY, RIGHT TRANSZONE:  Benign prostate tissue  Negative for malignancy    H.     PROSTATE, NEEDLE CORE BIOPSY, LEFT TRANSZONE:  Benign prostate tissue  Negative for malignancy       I have reviewed above labs and imaging.     Assessment  68 y.o. male with new diagnosis of low risk Prostate cancer.  We discussed that he meets Mandy criteria and I recommended active surveillance.  We discussed the risks and benefits and expectations of active surveillance with PSA every 6 to 12 months, MRI every 1 to 2 years, and repeat biopsy every 3 to 5 years.    Plan  1. FU in 6 mo w/ prosper w PSA.  If this is stable, we will plan for MRI with her in 1 year.

## 2024-10-16 ENCOUNTER — OFFICE VISIT (OUTPATIENT)
Dept: INTERNAL MEDICINE | Facility: CLINIC | Age: 69
End: 2024-10-16
Payer: MEDICARE

## 2024-10-16 VITALS
TEMPERATURE: 98.2 F | HEIGHT: 70 IN | WEIGHT: 235 LBS | SYSTOLIC BLOOD PRESSURE: 118 MMHG | DIASTOLIC BLOOD PRESSURE: 64 MMHG | OXYGEN SATURATION: 96 % | BODY MASS INDEX: 33.64 KG/M2 | HEART RATE: 66 BPM

## 2024-10-16 DIAGNOSIS — I10 PRIMARY HYPERTENSION: ICD-10-CM

## 2024-10-16 DIAGNOSIS — I48.0 PAROXYSMAL A-FIB: ICD-10-CM

## 2024-10-16 DIAGNOSIS — J40 BRONCHITIS: Primary | ICD-10-CM

## 2024-10-16 PROCEDURE — 3078F DIAST BP <80 MM HG: CPT | Performed by: NURSE PRACTITIONER

## 2024-10-16 PROCEDURE — 3044F HG A1C LEVEL LT 7.0%: CPT | Performed by: NURSE PRACTITIONER

## 2024-10-16 PROCEDURE — G2211 COMPLEX E/M VISIT ADD ON: HCPCS | Performed by: NURSE PRACTITIONER

## 2024-10-16 PROCEDURE — 3074F SYST BP LT 130 MM HG: CPT | Performed by: NURSE PRACTITIONER

## 2024-10-16 PROCEDURE — 1160F RVW MEDS BY RX/DR IN RCRD: CPT | Performed by: NURSE PRACTITIONER

## 2024-10-16 PROCEDURE — 99214 OFFICE O/P EST MOD 30 MIN: CPT | Performed by: NURSE PRACTITIONER

## 2024-10-16 PROCEDURE — 1159F MED LIST DOCD IN RCRD: CPT | Performed by: NURSE PRACTITIONER

## 2024-10-16 PROCEDURE — 1125F AMNT PAIN NOTED PAIN PRSNT: CPT | Performed by: NURSE PRACTITIONER

## 2024-10-16 RX ORDER — BENZONATATE 100 MG/1
100 CAPSULE ORAL 3 TIMES DAILY PRN
Qty: 20 CAPSULE | Refills: 0 | Status: SHIPPED | OUTPATIENT
Start: 2024-10-16

## 2024-10-16 RX ORDER — AZITHROMYCIN 250 MG/1
TABLET, FILM COATED ORAL
Qty: 6 TABLET | Refills: 0 | Status: SHIPPED | OUTPATIENT
Start: 2024-10-16

## 2024-10-16 NOTE — PROGRESS NOTES
Office Visit      Patient Name: Harley Flynn  : 1955   MRN: [id number]     Chief Complaint:    Chief Complaint   Patient presents with    Cough     2 weeks, started with sinus drainage     History of Present Illness   Harley Flynn is a 69-year-old male who presents for evaluation of sinus drainage. He is accompanied by his wife.    He began experiencing sinus drainage a few weeks ago, which has since progressed to a non-productive cough. He has been taking mucus relief medication and using a nasal spray daily, which initially provided some relief. However, his cough has been disrupting his sleep, with last night being particularly severe. He visited a doctor yesterday who suspected pneumonia. He has been in contact with his grandchildren, one of whom had pneumonia. Most recent antibiotic: Cipro by Dr. Sparks in 2024. He has not previously used Tessalon Perles. No fever, chills, n/v/d, rash, myalgia.      He reports occasional dizziness and lightheadedness, particularly upon standing, a symptom he has experienced since a stroke. He does not experience facial pressure when looking down and reports no shortness of breath or palpitations. His appetite remains good.    He has intermittently complained of a sore throat during this period. He reports no earache but has been using ear drops daily for the past two weeks due to blocked ears.    Hypertension, A-fib.  Taking amlodipine, apixaban, atorvastatin, chlorthalidone, lisinopril, metoprolol as prescribed. Denies chest pain, dyspnea, orthopnea, palpitations, lower extremity edema, confusion, headaches, weakness, visual disturbances.    IMMUNIZATIONS  He is up-to-date on his influenza and COVID-19 vaccines.        Subjective      I have reviewed and the following portions of the patient's history were updated as appropriate: past family history, past medical history, past social history, past surgical history and problem  list.      Current Outpatient Medications:     acetaminophen (TYLENOL) 500 MG tablet, , Disp: , Rfl:     amLODIPine (NORVASC) 10 MG tablet, Take 1 tablet by mouth Daily., Disp: 90 tablet, Rfl: 3    amLODIPine (NORVASC) 2.5 MG tablet, Take 1 tablet by mouth Daily., Disp: 90 tablet, Rfl: 3    apixaban (Eliquis) 5 MG tablet tablet, Take 1 tablet by mouth 2 (Two) Times a Day., Disp: 180 tablet, Rfl: 1    ARIPiprazole (ABILIFY) 5 MG tablet, , Disp: , Rfl:     atorvastatin (LIPITOR) 80 MG tablet, Take 1 tablet by mouth Every Night., Disp: 90 tablet, Rfl: 3    Austedo XR 12 MG tablet sustained-release 24 hour, Take 36 mg by mouth., Disp: , Rfl:     Austedo XR 24 MG tablet sustained-release 24 hour, Take 36 mg by mouth., Disp: , Rfl:     azelastine (ASTELIN) 0.1 % nasal spray, 2 sprays into the nostril(s) as directed by provider 2 (Two) Times a Day. Use in each nostril as directed, Disp: 30 mL, Rfl: 12    azithromycin (Zithromax Z-Abhishek) 250 MG tablet, Take 2 tablets by mouth on day 1, then 1 tablet daily on days 2-5, Disp: 6 tablet, Rfl: 0    benzonatate (Tessalon Perles) 100 MG capsule, Take 1 capsule by mouth 3 (Three) Times a Day As Needed for Cough., Disp: 20 capsule, Rfl: 0    chlorthalidone (HYGROTON) 25 MG tablet, Take 1 tablet by mouth Daily., Disp: 90 tablet, Rfl: 3    desloratadine (CLARINEX REDITAB) 5 MG disintegrating tablet, Place 1 tablet on the tongue Daily., Disp: 90 tablet, Rfl: 3    hydrOXYzine pamoate (VISTARIL) 100 MG capsule, , Disp: , Rfl:     ipratropium (ATROVENT) 0.03 % nasal spray, 2 sprays into the nostril(s) as directed by provider Every 12 (Twelve) Hours., Disp: 30 mL, Rfl: 12    lisinopril (PRINIVIL,ZESTRIL) 40 MG tablet, Take 1 tablet by mouth Daily., Disp: 90 tablet, Rfl: 3    metFORMIN (GLUCOPHAGE) 500 MG tablet, Take 1 tablet by mouth 2 (Two) Times a Day With Meals., Disp: 180 tablet, Rfl: 3    metoprolol succinate XL (TOPROL-XL) 25 MG 24 hr tablet, , Disp: , Rfl:     Multiple  "Vitamins-Minerals (EYE VITAMINS PO), Take 1 tablet by mouth 2 (Two) Times a Day., Disp: , Rfl:     multivitamin with minerals tablet tablet, Take 1 tablet by mouth Daily., Disp: , Rfl:     sertraline (ZOLOFT) 100 MG tablet, Take 1 tablet by mouth., Disp: , Rfl:     traZODone (DESYREL) 100 MG tablet, , Disp: , Rfl:     No Known Allergies    Objective     Physical Exam:  Vital Signs:   Vitals:    10/16/24 1317   BP: 118/64   Pulse: 66   Temp: 98.2 °F (36.8 °C)   SpO2: 96%   Weight: 107 kg (235 lb)   Height: 177.8 cm (70\")     Body mass index is 33.72 kg/m².         Physical Exam  Constitutional:       Appearance: He is not ill-appearing.   HENT:      Head: Normocephalic.      Right Ear: External ear normal.      Left Ear: External ear normal.   Eyes:      Conjunctiva/sclera: Conjunctivae normal.      Pupils: Pupils are equal, round, and reactive to light.   Cardiovascular:      Rate and Rhythm: Normal rate and regular rhythm.      Pulses:           Radial pulses are 2+ on the right side and 2+ on the left side.        Dorsalis pedis pulses are 2+ on the right side and 2+ on the left side.      Heart sounds: Normal heart sounds.   Pulmonary:      Effort: Pulmonary effort is normal.      Breath sounds: Rhonchi present. No wheezing or rales.   Musculoskeletal:      Cervical back: Normal range of motion and neck supple.   Skin:     General: Skin is warm.      Capillary Refill: Capillary refill takes less than 2 seconds.   Neurological:      Mental Status: He is alert and oriented to person, place, and time.      Coordination: Coordination normal.      Gait: Gait normal.   Psychiatric:         Attention and Perception: Attention normal.         Mood and Affect: Mood and affect normal.         Speech: Speech normal.         Behavior: Behavior normal.             Assessment / Plan      Assessment/Plan:   Diagnoses and all orders for this visit:    1. Bronchitis (Primary)  -     azithromycin (Zithromax Z-Bahishek) 250 MG tablet; " Take 2 tablets by mouth on day 1, then 1 tablet daily on days 2-5  Dispense: 6 tablet; Refill: 0.  Has taken this in the past year without adverse effects.    -     benzonatate (Tessalon Perles) 100 MG capsule; Take 1 capsule by mouth 3 (Three) Times a Day As Needed for Cough.  Dispense: 20 capsule; Refill: 0    2. Primary hypertension  3. Paroxysmal A-fib        - Follow heart healthy diet.  Keep sodium intake < 1500 mg per day.  Avoid processed & fast foods.          - Exercise as tolerated, with a goal of 30 minutes of moderate exercise most days.         - Take medications as prescribed.           Follow Up:   Return if symptoms worsen or fail to improve.    Patient was given instructions and counseling regarding his condition or for health maintenance advice. Please see specific information pulled into the AVS if appropriate.       Primary [facility] [address] [facility]     Please note that portions of this note may have been completed with a voice recognition program. Efforts were made to edit dictation, but occasionally words are mistranscribed.

## 2024-10-25 ENCOUNTER — PATIENT MESSAGE (OUTPATIENT)
Dept: INTERNAL MEDICINE | Facility: CLINIC | Age: 69
End: 2024-10-25
Payer: MEDICARE

## 2024-10-25 DIAGNOSIS — I48.0 PAROXYSMAL A-FIB: ICD-10-CM

## 2024-11-04 DIAGNOSIS — I48.0 PAROXYSMAL A-FIB: ICD-10-CM

## 2024-11-12 DIAGNOSIS — I48.0 PAROXYSMAL A-FIB: ICD-10-CM

## 2024-11-13 RX ORDER — APIXABAN 5 MG/1
5 TABLET, FILM COATED ORAL 2 TIMES DAILY
Qty: 180 TABLET | Refills: 1 | OUTPATIENT
Start: 2024-11-13

## 2024-12-09 DIAGNOSIS — J30.89 ENVIRONMENTAL AND SEASONAL ALLERGIES: ICD-10-CM

## 2024-12-09 RX ORDER — AZELASTINE HYDROCHLORIDE 137 UG/1
SPRAY, METERED NASAL
Qty: 30 ML | Refills: 12 | Status: SHIPPED | OUTPATIENT
Start: 2024-12-09

## 2024-12-21 DIAGNOSIS — R73.9 HYPERGLYCEMIA: ICD-10-CM

## 2024-12-26 DIAGNOSIS — I10 PRIMARY HYPERTENSION: ICD-10-CM

## 2024-12-26 RX ORDER — LISINOPRIL 40 MG/1
40 TABLET ORAL DAILY
Qty: 90 TABLET | Refills: 3 | Status: SHIPPED | OUTPATIENT
Start: 2024-12-26

## 2025-02-24 ENCOUNTER — OFFICE VISIT (OUTPATIENT)
Dept: INTERNAL MEDICINE | Facility: CLINIC | Age: 70
End: 2025-02-24
Payer: MEDICARE

## 2025-02-24 VITALS
OXYGEN SATURATION: 97 % | RESPIRATION RATE: 17 BRPM | HEART RATE: 67 BPM | DIASTOLIC BLOOD PRESSURE: 80 MMHG | TEMPERATURE: 98.9 F | WEIGHT: 249 LBS | BODY MASS INDEX: 35.65 KG/M2 | HEIGHT: 70 IN | SYSTOLIC BLOOD PRESSURE: 130 MMHG

## 2025-02-24 DIAGNOSIS — I10 PRIMARY HYPERTENSION: Primary | ICD-10-CM

## 2025-02-24 PROCEDURE — 99214 OFFICE O/P EST MOD 30 MIN: CPT | Performed by: NURSE PRACTITIONER

## 2025-02-24 PROCEDURE — 1125F AMNT PAIN NOTED PAIN PRSNT: CPT | Performed by: NURSE PRACTITIONER

## 2025-02-24 PROCEDURE — G2211 COMPLEX E/M VISIT ADD ON: HCPCS | Performed by: NURSE PRACTITIONER

## 2025-02-24 PROCEDURE — 3079F DIAST BP 80-89 MM HG: CPT | Performed by: NURSE PRACTITIONER

## 2025-02-24 PROCEDURE — 3075F SYST BP GE 130 - 139MM HG: CPT | Performed by: NURSE PRACTITIONER

## 2025-02-24 RX ORDER — DEUTETRABENAZINE 42 MG/1
TABLET, FILM COATED, EXTENDED RELEASE ORAL
COMMUNITY
Start: 2025-02-17

## 2025-02-25 ENCOUNTER — OFFICE VISIT (OUTPATIENT)
Dept: UROLOGY | Facility: CLINIC | Age: 70
End: 2025-02-25
Payer: MEDICARE

## 2025-02-25 ENCOUNTER — LAB (OUTPATIENT)
Dept: LAB | Facility: HOSPITAL | Age: 70
End: 2025-02-25
Payer: MEDICARE

## 2025-02-25 VITALS
RESPIRATION RATE: 16 BRPM | BODY MASS INDEX: 35.65 KG/M2 | OXYGEN SATURATION: 96 % | TEMPERATURE: 97.4 F | WEIGHT: 249 LBS | HEART RATE: 60 BPM | HEIGHT: 70 IN

## 2025-02-25 DIAGNOSIS — N40.1 BENIGN PROSTATIC HYPERPLASIA WITH POST-VOID DRIBBLING: ICD-10-CM

## 2025-02-25 DIAGNOSIS — C61 PROSTATE CANCER: Primary | ICD-10-CM

## 2025-02-25 DIAGNOSIS — N39.43 BENIGN PROSTATIC HYPERPLASIA WITH POST-VOID DRIBBLING: ICD-10-CM

## 2025-02-25 DIAGNOSIS — C61 PROSTATE CANCER: ICD-10-CM

## 2025-02-25 PROBLEM — F33.0 RECURRENT MAJOR DEPRESSIVE EPISODES, MILD: Status: ACTIVE | Noted: 2023-08-30

## 2025-02-25 PROBLEM — I63.432 CEREBROVASCULAR ACCIDENT (CVA) DUE TO EMBOLISM OF LEFT POSTERIOR CEREBRAL ARTERY: Status: ACTIVE | Noted: 2023-04-16

## 2025-02-25 PROBLEM — E29.1 HYPOGONADISM IN MALE: Status: ACTIVE | Noted: 2023-08-30

## 2025-02-25 PROBLEM — G90.521 COMPLEX REGIONAL PAIN SYNDROME TYPE 1 OF RIGHT LOWER EXTREMITY: Status: ACTIVE | Noted: 2022-10-27

## 2025-02-25 PROBLEM — Q21.12 PFO (PATENT FORAMEN OVALE): Status: ACTIVE | Noted: 2023-05-11

## 2025-02-25 PROBLEM — G24.01 DRUG-INDUCED DYSKINESIA: Status: ACTIVE | Noted: 2024-04-01

## 2025-02-25 LAB
ALBUMIN SERPL-MCNC: 3.6 G/DL (ref 3.5–5.2)
ALBUMIN/GLOB SERPL: 1.2 G/DL
ALP SERPL-CCNC: 117 U/L (ref 39–117)
ALT SERPL W P-5'-P-CCNC: 15 U/L (ref 1–41)
ANION GAP SERPL CALCULATED.3IONS-SCNC: 11.8 MMOL/L (ref 5–15)
AST SERPL-CCNC: 17 U/L (ref 1–40)
BILIRUB SERPL-MCNC: 0.3 MG/DL (ref 0–1.2)
BUN SERPL-MCNC: 23 MG/DL (ref 8–23)
BUN/CREAT SERPL: 21.5 (ref 7–25)
CALCIUM SPEC-SCNC: 9.5 MG/DL (ref 8.6–10.5)
CHLORIDE SERPL-SCNC: 101 MMOL/L (ref 98–107)
CO2 SERPL-SCNC: 28.2 MMOL/L (ref 22–29)
CREAT SERPL-MCNC: 1.07 MG/DL (ref 0.76–1.27)
DEPRECATED RDW RBC AUTO: 44 FL (ref 37–54)
EGFRCR SERPLBLD CKD-EPI 2021: 74.2 ML/MIN/1.73
ERYTHROCYTE [DISTWIDTH] IN BLOOD BY AUTOMATED COUNT: 13.2 % (ref 12.3–15.4)
GLOBULIN UR ELPH-MCNC: 3 GM/DL
GLUCOSE SERPL-MCNC: 91 MG/DL (ref 65–99)
HCT VFR BLD AUTO: 40.9 % (ref 37.5–51)
HGB BLD-MCNC: 13.7 G/DL (ref 13–17.7)
MCH RBC QN AUTO: 30.6 PG (ref 26.6–33)
MCHC RBC AUTO-ENTMCNC: 33.5 G/DL (ref 31.5–35.7)
MCV RBC AUTO: 91.5 FL (ref 79–97)
PLATELET # BLD AUTO: 304 10*3/MM3 (ref 140–450)
PMV BLD AUTO: 10.1 FL (ref 6–12)
POTASSIUM SERPL-SCNC: 4.3 MMOL/L (ref 3.5–5.2)
PROT SERPL-MCNC: 6.6 G/DL (ref 6–8.5)
PSA SERPL-MCNC: 4.35 NG/ML (ref 0–4)
RBC # BLD AUTO: 4.47 10*6/MM3 (ref 4.14–5.8)
SODIUM SERPL-SCNC: 141 MMOL/L (ref 136–145)
WBC NRBC COR # BLD AUTO: 7.69 10*3/MM3 (ref 3.4–10.8)

## 2025-02-25 PROCEDURE — 85027 COMPLETE CBC AUTOMATED: CPT | Performed by: NURSE PRACTITIONER

## 2025-02-25 PROCEDURE — 36415 COLL VENOUS BLD VENIPUNCTURE: CPT | Performed by: NURSE PRACTITIONER

## 2025-02-25 PROCEDURE — 84153 ASSAY OF PSA TOTAL: CPT

## 2025-02-25 PROCEDURE — 84403 ASSAY OF TOTAL TESTOSTERONE: CPT | Performed by: NURSE PRACTITIONER

## 2025-02-25 PROCEDURE — 80053 COMPREHEN METABOLIC PANEL: CPT | Performed by: NURSE PRACTITIONER

## 2025-02-25 RX ORDER — TAMSULOSIN HYDROCHLORIDE 0.4 MG/1
1 CAPSULE ORAL DAILY
Qty: 30 CAPSULE | Refills: 2 | Status: SHIPPED | OUTPATIENT
Start: 2025-02-25

## 2025-02-25 NOTE — PROGRESS NOTES
Office Visit     Patient Name: Harley Flynn  : 1955   MRN: [id number]   Patient or patient representative verbalized consent for the use of Ambient Listening during the visit with  MIRACLE Carrasco for chart documentation. 2025  09:19 EST    Chief Complaint   Patient presents with    Elevated PSA     CC-urinary dribbling after voiding     Prostate Cancer    Follow-up     Referring Provider: No ref. provider found    Primary Care Provider: Marielle Yin APRN     History of Present Illness  The patient is a 69-year-old male with prostate cancer, Ada 6 disease in 3 cores, on active surveillance. He presents to discuss LUTS and PSA results. Recent PSA in 2024 was 8.04 on 4K Score. He is accompanied by his wife.  Previously on TRT but is doing okay without it at this time.      Urinary Dribbling  - Chief complaint: urinary dribbling after voiding, occurring most of the time.  - No treatment tried.  - Denies UTI symptoms, fever, chills, sweats, or dysuria.    Subjective   Review of System: As noted in HPI.  Past Medical History:   Diagnosis Date    Anxiety and depression     Arthritis     Cancer 2024    Prostate    Chronic pain     states needs a new right knee replacement (replaced in )    Constipation     Coronary artery disease     Afib    Deep vein thrombosis 2023    Stroke from blood clot    Elevated cholesterol     Elevated PSA 10/6/2023    Erectile dysfunction 2017    History of nuclear stress test 2016    pt states was WNL    Hypertension     Neuromuscular disorder 1999    RSD since .    Resolved itself earlier     PONV (postoperative nausea and vomiting)     Seasonal allergies     Sleep apnea     Stroke 04/15/2023    Visual impairment 04/15/2023    Blurry & right peripheral    Wears glasses      Past Surgical History:   Procedure Laterality Date    APPENDECTOMY      CARPAL TUNNEL RELEASE Right 2022    Procedure: RIGHT CARPAL  TUNNEL RELEASE AND RIGHT CUBITAL TUNNEL RELEASE;  Surgeon: Caleb Cristina MD;  Location: [facility];  Service: Orthopedics;  Laterality: Right;    COLONOSCOPY      HIP BIPOLAR REPLACEMENT Right 2024    JOINT REPLACEMENT  3023    Revision of 1999 replacement    REPLACEMENT TOTAL KNEE Right     WISDOM TOOTH EXTRACTION       Family History   Problem Relation Age of Onset    Diabetes Mother     Hypertension Mother     Mental illness Mother     Depression Mother     Hyperlipidemia Father      Social History     Socioeconomic History    Marital status:    Tobacco Use    Smoking status: Former     Current packs/day: 0.00     Average packs/day: 2.0 packs/day for 25.0 years (50.0 ttl pk-yrs)     Types: Cigarettes     Start date: 1966     Quit date: 1991     Years since quittin.1     Passive exposure: Never    Smokeless tobacco: Never    Tobacco comments:     Smoked since age 11   Vaping Use    Vaping status: Never Used   Substance and Sexual Activity    Alcohol use: Not Currently     Comment: Nothing for 30+ years.   Prior, casual drinking    Drug use: Never    Sexual activity: Not Currently     Partners: Female     Birth control/protection: Abstinence     Comment: i have ED       Current Outpatient Medications:     amLODIPine (NORVASC) 10 MG tablet, Take 1 tablet by mouth Daily., Disp: 90 tablet, Rfl: 3    amLODIPine (NORVASC) 2.5 MG tablet, Take 1 tablet by mouth Daily., Disp: 90 tablet, Rfl: 3    apixaban (Eliquis) 5 MG tablet tablet, Take 1 tablet by mouth 2 (Two) Times a Day., Disp: 180 tablet, Rfl: 1    ARIPiprazole (ABILIFY) 5 MG tablet, , Disp: , Rfl:     atorvastatin (LIPITOR) 80 MG tablet, Take 1 tablet by mouth Every Night., Disp: 90 tablet, Rfl: 3    Austedo XR 42 MG tablet sustained-release 24 hour, , Disp: , Rfl:     Azelastine HCl 137 MCG/SPRAY solution, INSTILL 2 SPRAYS IN EACH NOSTRIL TWO TIMES A DAY AS DIRECTED BY PROVIDER, Disp: 30 mL, Rfl: 12    chlorthalidone (HYGROTON)  "25 MG tablet, Take 1 tablet by mouth Daily., Disp: 90 tablet, Rfl: 3    desloratadine (CLARINEX REDITAB) 5 MG disintegrating tablet, Place 1 tablet on the tongue Daily., Disp: 90 tablet, Rfl: 3    hydrOXYzine pamoate (VISTARIL) 100 MG capsule, , Disp: , Rfl:     ipratropium (ATROVENT) 0.03 % nasal spray, 2 sprays into the nostril(s) as directed by provider Every 12 (Twelve) Hours., Disp: 30 mL, Rfl: 12    lisinopril (PRINIVIL,ZESTRIL) 40 MG tablet, TAKE ONE TABLET BY MOUTH EVERY DAY, Disp: 90 tablet, Rfl: 3    metFORMIN (GLUCOPHAGE) 500 MG tablet, TAKE ONE TABLET BY MOUTH TWO TIMES A DAY WITH MEALS, Disp: 180 tablet, Rfl: 3    metoprolol succinate XL (TOPROL-XL) 25 MG 24 hr tablet, , Disp: , Rfl:     Multiple Vitamins-Minerals (EYE VITAMINS PO), Take 1 tablet by mouth 2 (Two) Times a Day., Disp: , Rfl:     multivitamin with minerals tablet tablet, Take 1 tablet by mouth Daily., Disp: , Rfl:     sertraline (ZOLOFT) 100 MG tablet, Take 1 tablet by mouth., Disp: , Rfl:     traZODone (DESYREL) 100 MG tablet, , Disp: , Rfl:     tamsulosin (FLOMAX) 0.4 MG capsule 24 hr capsule, Take 1 capsule by mouth Daily., Disp: 30 capsule, Rfl: 2  No Known Allergies  Objective   Visit Vitals  Pulse 60   Temp 97.4 °F (36.3 °C) (Temporal)   Resp 16   Ht 177.8 cm (70\")   Wt 113 kg (249 lb)   SpO2 96%   BMI 35.73 kg/m²      Body mass index is 35.73 kg/m².   Physical Exam  Vitals and nursing note reviewed.   Constitutional:       General: He is not in acute distress.     Appearance: Normal appearance. He is obese. He is not ill-appearing.   HENT:      Head: Normocephalic and atraumatic.   Pulmonary:      Effort: Pulmonary effort is normal.   Skin:     General: Skin is warm and dry.   Neurological:      Mental Status: He is alert and oriented to person, place, and time.   Psychiatric:         Mood and Affect: Mood normal.         Behavior: Behavior normal.      Labs  Lab Results   Component Value Date    COLORU Yellow 04/16/2023    CLARITYU " "Clear 04/16/2023    PHUR 7.0 04/16/2023    LEUKOCYTESUR Negative 04/16/2023    BILIRUBINUR Negative 04/16/2023     Lab Results   Component Value Date    WBC 8.14 08/20/2024    HGB 12.7 (L) 08/20/2024    HCT 39.0 08/20/2024    MCV 94.4 08/20/2024     08/20/2024     Lab Results   Component Value Date    GLUCOSE 80 08/20/2024    CALCIUM 9.4 08/20/2024     08/20/2024    K 4.3 08/20/2024    CO2 29.2 (H) 08/20/2024     08/20/2024    BUN 21 08/20/2024    BUN 15 04/11/2024    CREATININE 0.93 08/20/2024    CREATININE 1.00 06/14/2024    EGFR 88.3 08/20/2024    EGFR 89.5 04/11/2024    BCR 22.6 08/20/2024    ANIONGAP 11.5 10/06/2023    ALT 21 08/20/2024    AST 17 08/20/2024     Lab Results   Component Value Date    HGBA1C 6.0 (H) 06/03/2024     Lab Results   Component Value Date    PSA 5.430 (H) 04/11/2024    PSA 3.710 10/27/2023    PSA 4.150 (H) 10/06/2023     No results found for: \"URICACIDSTN\", \"DJSQ3CBRIQH\", \"ISQH4NVKIQ\", \"LABMAGN\"  Lab Results   Component Value Date    CAION 4.5 (L) 06/17/2024     Lab Results   Component Value Date    TESTOSTEROTT 185 (L) 04/11/2024    TESTFRE 2.4 (L) 05/15/2023    PSA 5.430 (H) 04/11/2024       Radiographic Studies  No Images in the past 120 days found..    I have reviewed the above labs and imaging.     PVR  Post-void residual performed with ultrasound by staff and interpreted by me - 0 mL    Assessment / Plan    Diagnoses and all orders for this visit:    1. Prostate cancer (Primary)  -     PSA Diagnostic; Future    2. Benign prostatic hyperplasia with post-void dribbling  -     tamsulosin (FLOMAX) 0.4 MG capsule 24 hr capsule; Take 1 capsule by mouth Daily.  Dispense: 30 capsule; Refill: 2       Assessment & Plan  1. Post-void dribbling  - PVR was zero  - No UTI symptoms  - Declines BPH workup for now  - Start tamsulosin 0.4 mg nightly for 2-3 months    2. Elevated PSA  - Recent PSA 8.04 in 04/2024  - Prostate biopsy in 08/2024  - Repeat PSA test needed, plans to " have drawn today. If stable will plan to do a prostate MRI in 6 months.         Return in about 3 months (around 5/25/2025) for f/u with Deedee for medication efficacy check .    Deedee Arriola, MSN, APRN, FNP-C  [facility] [place]

## 2025-03-15 DIAGNOSIS — E78.5 DYSLIPIDEMIA: ICD-10-CM

## 2025-03-15 RX ORDER — ATORVASTATIN CALCIUM 80 MG/1
80 TABLET, FILM COATED ORAL NIGHTLY
Qty: 90 TABLET | Refills: 3 | Status: SHIPPED | OUTPATIENT
Start: 2025-03-15

## 2025-03-31 RX ORDER — CHLORTHALIDONE 25 MG/1
25 TABLET ORAL DAILY
Qty: 90 TABLET | Refills: 3 | Status: SHIPPED | OUTPATIENT
Start: 2025-03-31

## 2025-04-07 ENCOUNTER — OFFICE VISIT (OUTPATIENT)
Dept: INTERNAL MEDICINE | Facility: CLINIC | Age: 70
End: 2025-04-07
Payer: MEDICARE

## 2025-04-07 VITALS
RESPIRATION RATE: 22 BRPM | HEART RATE: 69 BPM | HEIGHT: 70 IN | SYSTOLIC BLOOD PRESSURE: 116 MMHG | WEIGHT: 247 LBS | BODY MASS INDEX: 35.36 KG/M2 | TEMPERATURE: 97.9 F | DIASTOLIC BLOOD PRESSURE: 68 MMHG | OXYGEN SATURATION: 94 %

## 2025-04-07 DIAGNOSIS — J06.9 UPPER RESPIRATORY TRACT INFECTION DUE TO COVID-19 VIRUS: Primary | ICD-10-CM

## 2025-04-07 DIAGNOSIS — U07.1 UPPER RESPIRATORY TRACT INFECTION DUE TO COVID-19 VIRUS: Primary | ICD-10-CM

## 2025-04-07 LAB
EXPIRATION DATE: ABNORMAL
FLUAV AG UPPER RESP QL IA.RAPID: NOT DETECTED
FLUBV AG UPPER RESP QL IA.RAPID: NOT DETECTED
INTERNAL CONTROL: ABNORMAL
Lab: ABNORMAL
SARS-COV-2 AG UPPER RESP QL IA.RAPID: DETECTED

## 2025-04-07 PROCEDURE — 1125F AMNT PAIN NOTED PAIN PRSNT: CPT | Performed by: STUDENT IN AN ORGANIZED HEALTH CARE EDUCATION/TRAINING PROGRAM

## 2025-04-07 PROCEDURE — 3078F DIAST BP <80 MM HG: CPT | Performed by: STUDENT IN AN ORGANIZED HEALTH CARE EDUCATION/TRAINING PROGRAM

## 2025-04-07 PROCEDURE — 3074F SYST BP LT 130 MM HG: CPT | Performed by: STUDENT IN AN ORGANIZED HEALTH CARE EDUCATION/TRAINING PROGRAM

## 2025-04-07 PROCEDURE — 99213 OFFICE O/P EST LOW 20 MIN: CPT | Performed by: STUDENT IN AN ORGANIZED HEALTH CARE EDUCATION/TRAINING PROGRAM

## 2025-04-07 RX ORDER — ALBUTEROL SULFATE 90 UG/1
2 INHALANT RESPIRATORY (INHALATION) EVERY 4 HOURS PRN
Qty: 18 G | Refills: 3 | Status: SHIPPED | OUTPATIENT
Start: 2025-04-07

## 2025-04-07 RX ORDER — GUAIFENESIN 600 MG/1
600 TABLET, EXTENDED RELEASE ORAL 2 TIMES DAILY
Qty: 20 TABLET | Refills: 2 | Status: SHIPPED | OUTPATIENT
Start: 2025-04-07

## 2025-04-07 RX ORDER — BENZONATATE 100 MG/1
100 CAPSULE ORAL 2 TIMES DAILY PRN
Qty: 30 CAPSULE | Refills: 0 | Status: SHIPPED | OUTPATIENT
Start: 2025-04-07

## 2025-04-07 RX ORDER — ASPIRIN 81 MG
100 TABLET, DELAYED RELEASE (ENTERIC COATED) ORAL
COMMUNITY

## 2025-04-07 NOTE — PROGRESS NOTES
Office Note     Name: Harley Flynn    : 1955     MRN: [id number]     Chief Complaint  Cough (With shortness of breath)    Subjective     History of Present Illness:  Harley Flynn is a 69 y.o. male who presents today for cough, nasal congestion, shortness of breath and low grade fever x 2 days. Has tried tylenol, and OTC mucous relief.       Family History:   Family History   Problem Relation Age of Onset    Diabetes Mother     Hypertension Mother     Mental illness Mother     Depression Mother     Hyperlipidemia Father        Social History:   Social History     Socioeconomic History    Marital status:    Tobacco Use    Smoking status: Former     Current packs/day: 0.00     Average packs/day: 2.0 packs/day for 25.0 years (50.0 ttl pk-yrs)     Types: Cigarettes     Start date: 1966     Quit date: 1991     Years since quittin.2     Passive exposure: Never    Smokeless tobacco: Never    Tobacco comments:     Smoked since age 11   Vaping Use    Vaping status: Never Used   Substance and Sexual Activity    Alcohol use: Not Currently     Comment: Nothing for 30+ years.   Prior, casual drinking    Drug use: Never    Sexual activity: Not Currently     Partners: Female     Birth control/protection: Abstinence     Comment: i have ED       Health Maintenance   Topic Date Due    TDAP/TD VACCINES (2 - Td or Tdap) 2025 (Originally 3/20/2024)    COVID-19 Vaccine ( - - season) 2026 (Originally 2024)    INFLUENZA VACCINE  2025    ANNUAL WELLNESS VISIT  2025    LIPID PANEL  2025    COLORECTAL CANCER SCREENING  10/01/2031    HEPATITIS C SCREENING  Completed    Pneumococcal Vaccine 50+  Completed    AAA SCREEN ONCE  Completed    ZOSTER VACCINE  Completed    HEMOGLOBIN A1C  Discontinued       Patient Care Team:  Marielle Yin, MIRACLE as PCP - General (Family Medicine)  Flavio Sparks MD as Consulting Physician (Urology)  Brandy Arnold  "FARIHA (Physician Assistant)  Crispin Jimenez APRN as Nurse Practitioner (Nurse Practitioner)    Objective     Vital Signs  /68   Pulse 69   Temp 97.9 °F (36.6 °C) (Infrared)   Resp 22   Ht 177.8 cm (70\")   Wt 112 kg (247 lb)   SpO2 94%   BMI 35.44 kg/m²   Estimated body mass index is 35.44 kg/m² as calculated from the following:    Height as of this encounter: 177.8 cm (70\").    Weight as of this encounter: 112 kg (247 lb).        Physical Exam  Constitutional:       Appearance: Normal appearance.   HENT:      Head: Normocephalic.      Nose: Congestion present.   Eyes:      Conjunctiva/sclera: Conjunctivae normal.      Pupils: Pupils are equal, round, and reactive to light.   Cardiovascular:      Rate and Rhythm: Normal rate and regular rhythm.   Pulmonary:      Effort: Pulmonary effort is normal. No respiratory distress.      Breath sounds: Normal breath sounds. No wheezing, rhonchi or rales.   Skin:     General: Skin is warm and dry.   Neurological:      Mental Status: He is alert.          Procedures     Assessment and Plan     Diagnoses and all orders for this visit:    1. Upper respiratory tract infection due to COVID-19 virus (Primary)  -     guaiFENesin (Mucinex) 600 MG 12 hr tablet; Take 1 tablet by mouth 2 (Two) Times a Day.  Dispense: 20 tablet; Refill: 2  -     benzonatate (Tessalon Perles) 100 MG capsule; Take 1 capsule by mouth 2 (Two) Times a Day As Needed for Cough.  Dispense: 30 capsule; Refill: 0  -     albuterol sulfate  (90 Base) MCG/ACT inhaler; Inhale 2 puffs Every 4 (Four) Hours As Needed for Wheezing.  Dispense: 18 g; Refill: 3       COVID positive in-office test  Supportive treatment as prescribed  Advised to obtain a pulse oximeter to note O2 saturation. Goal is >90% and if O2 sat decreases to <90%, advised to proceed to the ED. Patient agreed and showed complete understanding.   Wear mask and practice isolation precautions for at least 5 days. Advised of warning " signs including fever, difficulty breathing and low O2 saturation. Proceed to the ER if any of these occur. Patient agreed with complete understanding.     Counseling was given to patient and family for the following topics: instructions for management, risks and benefits of treatment options, and importance of treatment compliance.    Follow Up  No follow-ups on file.    MD RON Cannon [facility]  [address] [place] [id number] [phone]

## 2025-04-09 DIAGNOSIS — I48.0 PAROXYSMAL A-FIB: ICD-10-CM

## 2025-04-10 RX ORDER — AMLODIPINE BESYLATE 10 MG/1
10 TABLET ORAL DAILY
Qty: 90 TABLET | Refills: 3 | Status: SHIPPED | OUTPATIENT
Start: 2025-04-10

## 2025-04-10 RX ORDER — APIXABAN 5 MG/1
5 TABLET, FILM COATED ORAL EVERY 12 HOURS SCHEDULED
Qty: 180 TABLET | Refills: 1 | Status: SHIPPED | OUTPATIENT
Start: 2025-04-10

## 2025-05-20 DIAGNOSIS — N39.43 BENIGN PROSTATIC HYPERPLASIA WITH POST-VOID DRIBBLING: ICD-10-CM

## 2025-05-20 DIAGNOSIS — N40.1 BENIGN PROSTATIC HYPERPLASIA WITH POST-VOID DRIBBLING: ICD-10-CM

## 2025-05-20 RX ORDER — TAMSULOSIN HYDROCHLORIDE 0.4 MG/1
1 CAPSULE ORAL DAILY
Qty: 30 CAPSULE | Refills: 2 | Status: SHIPPED | OUTPATIENT
Start: 2025-05-20

## 2025-05-27 ENCOUNTER — OFFICE VISIT (OUTPATIENT)
Dept: UROLOGY | Facility: CLINIC | Age: 70
End: 2025-05-27
Payer: MEDICARE

## 2025-05-27 VITALS
HEART RATE: 64 BPM | TEMPERATURE: 97.4 F | OXYGEN SATURATION: 97 % | DIASTOLIC BLOOD PRESSURE: 60 MMHG | SYSTOLIC BLOOD PRESSURE: 110 MMHG

## 2025-05-27 DIAGNOSIS — N40.1 BENIGN PROSTATIC HYPERPLASIA WITH POST-VOID DRIBBLING: ICD-10-CM

## 2025-05-27 DIAGNOSIS — N39.43 BENIGN PROSTATIC HYPERPLASIA WITH POST-VOID DRIBBLING: ICD-10-CM

## 2025-05-27 DIAGNOSIS — E29.1 HYPOGONADISM IN MALE: ICD-10-CM

## 2025-05-27 DIAGNOSIS — C61 PROSTATE CANCER: Primary | ICD-10-CM

## 2025-05-27 PROBLEM — F41.1 GENERALIZED ANXIETY DISORDER: Status: ACTIVE | Noted: 2023-08-30

## 2025-05-27 PROCEDURE — 3074F SYST BP LT 130 MM HG: CPT | Performed by: NURSE PRACTITIONER

## 2025-05-27 PROCEDURE — 99213 OFFICE O/P EST LOW 20 MIN: CPT | Performed by: NURSE PRACTITIONER

## 2025-05-27 PROCEDURE — 3078F DIAST BP <80 MM HG: CPT | Performed by: NURSE PRACTITIONER

## 2025-05-27 PROCEDURE — G2211 COMPLEX E/M VISIT ADD ON: HCPCS | Performed by: NURSE PRACTITIONER

## 2025-05-27 NOTE — PROGRESS NOTES
Office Visit     Patient Name: Harley Flynn  : 1955   MRN: [id number]   Patient or patient representative verbalized consent for the use of Ambient Listening during the visit with  MIRACLE Carrasco for chart documentation. 2025  09:52 EDT    Chief Complaint   Patient presents with    Follow-up     Referring Provider: No ref. provider found    Primary Care Provider: Marielle Yin APRN     History of Present Illness  Mr. 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    Prostate Cancer  - Here to review PSA results.      LUTS  - No bothersome LUTS.      History of hypogonadism  - Feels okay off testosterone replacement      Subjective   Review of System: As noted in HPI.    Past Medical History:   Diagnosis Date    Anxiety and depression     Arthritis     Cancer 2024    Prostate    Chronic pain     states needs a new right knee replacement (replaced in )    Constipation     Coronary artery disease     Afib    Deep vein thrombosis 2023    Stroke from blood clot    Elevated cholesterol     Elevated PSA 10/6/2023    Erectile dysfunction 2017    History of nuclear stress test 2016    pt states was WNL    Hypertension     Neuromuscular disorder 1999    RSD since .    Resolved itself earlier     PONV (postoperative nausea and vomiting)     Prostate cancer 24    Seasonal allergies     Sleep apnea     Stroke 04/15/2023    Visual impairment 04/15/2023    Blurry & right peripheral    Wears glasses      Past Surgical History:   Procedure Laterality Date    APPENDECTOMY      CARPAL TUNNEL RELEASE Right 2022    Procedure: RIGHT CARPAL TUNNEL RELEASE AND RIGHT CUBITAL TUNNEL RELEASE;  Surgeon: Caleb Cristina MD;  Location: [facility];  Service: Orthopedics;  Laterality: Right;    COLONOSCOPY      HIP BIPOLAR REPLACEMENT Right 2024    JOINT  REPLACEMENT  3023    Revision of 1999 replacement    REPLACEMENT TOTAL KNEE Right     WISDOM TOOTH EXTRACTION       Family History   Problem Relation Age of Onset    Diabetes Mother     Hypertension Mother     Mental illness Mother     Depression Mother     Hyperlipidemia Father      Social History     Socioeconomic History    Marital status:    Tobacco Use    Smoking status: Former     Current packs/day: 0.00     Average packs/day: 2.0 packs/day for 25.0 years (50.0 ttl pk-yrs)     Types: Cigarettes     Start date: 1966     Quit date: 1991     Years since quittin.4     Passive exposure: Never    Smokeless tobacco: Never    Tobacco comments:     Smoked since age 11   Vaping Use    Vaping status: Never Used   Substance and Sexual Activity    Alcohol use: Not Currently     Comment: Nothing for 30+ years.   Prior, casual drinking    Drug use: Never    Sexual activity: Not Currently     Partners: Female     Birth control/protection: Abstinence     Comment: i have ED       Current Outpatient Medications:     albuterol sulfate  (90 Base) MCG/ACT inhaler, Inhale 2 puffs Every 4 (Four) Hours As Needed for Wheezing., Disp: 18 g, Rfl: 3    amLODIPine (NORVASC) 10 MG tablet, TAKE ONE TABLET BY MOUTH ONCE DAILY, Disp: 90 tablet, Rfl: 3    amLODIPine (NORVASC) 2.5 MG tablet, Take 1 tablet by mouth Daily., Disp: 90 tablet, Rfl: 3    ARIPiprazole (ABILIFY) 5 MG tablet, , Disp: , Rfl:     atorvastatin (LIPITOR) 80 MG tablet, TAKE 1 TABLET BY MOUTH EVERY NIGHT., Disp: 90 tablet, Rfl: 3    Austedo XR 42 MG tablet sustained-release 24 hour, , Disp: , Rfl:     Azelastine HCl 137 MCG/SPRAY solution, INSTILL 2 SPRAYS IN EACH NOSTRIL TWO TIMES A DAY AS DIRECTED BY PROVIDER, Disp: 30 mL, Rfl: 12    benzonatate (Tessalon Perles) 100 MG capsule, Take 1 capsule by mouth 2 (Two) Times a Day As Needed for Cough., Disp: 30 capsule, Rfl: 0    chlorthalidone (HYGROTON) 25 MG tablet, TAKE ONE TABLET BY MOUTH EVERY  DAY, Disp: 90 tablet, Rfl: 3    desloratadine (CLARINEX REDITAB) 5 MG disintegrating tablet, Place 1 tablet on the tongue Daily., Disp: 90 tablet, Rfl: 3    Docusate Sodium 100 MG capsule, Take 1 tablet by mouth., Disp: , Rfl:     Eliquis 5 MG tablet tablet, TAKE ONE TABLET BY MOUTH TWICE A DAY, Disp: 180 tablet, Rfl: 1    guaiFENesin (Mucinex) 600 MG 12 hr tablet, Take 1 tablet by mouth 2 (Two) Times a Day., Disp: 20 tablet, Rfl: 2    hydrOXYzine pamoate (VISTARIL) 100 MG capsule, , Disp: , Rfl:     ipratropium (ATROVENT) 0.03 % nasal spray, 2 sprays into the nostril(s) as directed by provider Every 12 (Twelve) Hours., Disp: 30 mL, Rfl: 12    lisinopril (PRINIVIL,ZESTRIL) 40 MG tablet, TAKE ONE TABLET BY MOUTH EVERY DAY, Disp: 90 tablet, Rfl: 3    metFORMIN (GLUCOPHAGE) 500 MG tablet, TAKE ONE TABLET BY MOUTH TWO TIMES A DAY WITH MEALS, Disp: 180 tablet, Rfl: 3    metoprolol succinate XL (TOPROL-XL) 25 MG 24 hr tablet, , Disp: , Rfl:     Multiple Vitamins-Minerals (EYE VITAMINS PO), Take 1 tablet by mouth 2 (Two) Times a Day., Disp: , Rfl:     multivitamin with minerals tablet tablet, Take 1 tablet by mouth Daily., Disp: , Rfl:     sertraline (ZOLOFT) 100 MG tablet, Take 1 tablet by mouth., Disp: , Rfl:     tamsulosin (FLOMAX) 0.4 MG capsule 24 hr capsule, TAKE 1 CAPSULE BY MOUTH DAILY, Disp: 30 capsule, Rfl: 2    traZODone (DESYREL) 100 MG tablet, , Disp: , Rfl:     No Known Allergies  Objective   Visit Vitals  /60   Pulse 64   Temp 97.4 °F (36.3 °C)   SpO2 97%      There is no height or weight on file to calculate BMI.   Physical Exam  Vitals and nursing note reviewed.   Constitutional:       General: He is not in acute distress.     Appearance: Normal appearance. He is obese. He is not ill-appearing.   HENT:      Head: Normocephalic and atraumatic.   Pulmonary:      Effort: Pulmonary effort is normal.   Abdominal:      General: Abdomen is protuberant.   Skin:     General: Skin is warm and dry.  "  Neurological:      General: No focal deficit present.      Mental Status: He is alert and oriented to person, place, and time.   Psychiatric:         Mood and Affect: Mood normal.         Behavior: Behavior normal.      Labs  Lab Results   Component Value Date    COLORU Yellow 04/16/2023    CLARITYU Clear 04/16/2023    PHUR 7.0 04/16/2023    LEUKOCYTESUR Negative 04/16/2023    BILIRUBINUR Negative 04/16/2023      Lab Results   Component Value Date    WBC 7.69 02/25/2025    HGB 13.7 02/25/2025    HCT 40.9 02/25/2025    MCV 91.5 02/25/2025     02/25/2025     Lab Results   Component Value Date    GLUCOSE 91 02/25/2025    CALCIUM 9.5 02/25/2025     02/25/2025    K 4.3 02/25/2025    CO2 28.2 02/25/2025     02/25/2025    BUN 23 02/25/2025    BUN 21 08/20/2024    CREATININE 1.07 02/25/2025    CREATININE 0.93 08/20/2024    EGFR 74.2 02/25/2025    EGFR 88.3 08/20/2024    BCR 21.5 02/25/2025    ANIONGAP 11.8 02/25/2025    ALT 15 02/25/2025    AST 17 02/25/2025     Lab Results   Component Value Date    HGBA1C 6.0 (H) 06/03/2024     Lab Results   Component Value Date    PSA 4.350 (H) 02/25/2025    PSA 5.430 (H) 04/11/2024    PSA 3.710 10/27/2023     No results found for: \"URICACIDSTN\", \"VVSF9DQSEZD\", \"ALNG9URKXI\", \"LABMAGN\"  Lab Results   Component Value Date    CAION 4.5 (L) 06/17/2024     Lab Results   Component Value Date    TESTOSTEROTT 185 (L) 04/11/2024    TESTFRE 2.4 (L) 05/15/2023    PSA 4.350 (H) 02/25/2025       Radiographic Studies  FL Esophagram w Modified Barium Swallow Single Contrast  Result Date: 2/28/2025  Modified barium swallow under fluoroscopic guidance. No significant episodes of penetration or aspiration across any consistency. Please see speech pathologist's report for further details and recommendations. Images reviewed, interpreted, and dictated by Dr. Jamar Jin. Transcribed by Virgie Grande PA-C.      I have reviewed the above labs and imaging.   Assessment / Plan    Diagnoses and " all orders for this visit:    1. Prostate cancer (Primary)  -     PSA Diagnostic; Future    2. Benign prostatic hyperplasia with post-void dribbling    3. Hypogonadism in male       Assessment & Plan  1. Prostate cancer  - PSA levels decreased from 5.4 to 4.35 since February  - Pathology report shows low-risk, low-volume disease  - Active surveillance involves monitoring PSA every 6 months and additional tests every 2-3 years  - MRI planned for next year but not yet ordered.    - Repeat biopsy recommended every 3-5 years  - Repeat PSA test in August  - Follow-up visit in 3 months  - Advised to contact clinic for new or worsening urinary symptoms     Return in about 3 months (around 8/27/2025) for f/u with Deedee with PSA .    Deedee Arriola, MSN, APRN, FNP-C  [facility]

## 2025-06-24 DIAGNOSIS — J30.89 ENVIRONMENTAL AND SEASONAL ALLERGIES: ICD-10-CM

## 2025-06-24 RX ORDER — DESLORATADINE 5 MG/1
TABLET, ORALLY DISINTEGRATING ORAL
Qty: 90 TABLET | Refills: 3 | Status: SHIPPED | OUTPATIENT
Start: 2025-06-24

## 2025-08-19 DIAGNOSIS — I10 PRIMARY HYPERTENSION: Primary | ICD-10-CM

## 2025-08-20 ENCOUNTER — TELEPHONE (OUTPATIENT)
Dept: UROLOGY | Facility: CLINIC | Age: 70
End: 2025-08-20
Payer: MEDICARE

## 2025-08-22 LAB
ALBUMIN SERPL-MCNC: 4.3 G/DL (ref 3.5–5.2)
ALBUMIN/GLOB SERPL: 1.8 G/DL
ALP SERPL-CCNC: 107 U/L (ref 39–117)
ALT SERPL-CCNC: 18 U/L (ref 1–41)
AST SERPL-CCNC: 18 U/L (ref 1–40)
BILIRUB SERPL-MCNC: 0.4 MG/DL (ref 0–1.2)
BUN SERPL-MCNC: 14 MG/DL (ref 8–23)
BUN/CREAT SERPL: 15.6 (ref 7–25)
CALCIUM SERPL-MCNC: 9.6 MG/DL (ref 8.6–10.5)
CHLORIDE SERPL-SCNC: 102 MMOL/L (ref 98–107)
CHOLEST SERPL-MCNC: 106 MG/DL (ref 0–200)
CO2 SERPL-SCNC: 25 MMOL/L (ref 22–29)
CREAT SERPL-MCNC: 0.9 MG/DL (ref 0.76–1.27)
EGFRCR SERPLBLD CKD-EPI 2021: 91.3 ML/MIN/1.73
ERYTHROCYTE [DISTWIDTH] IN BLOOD BY AUTOMATED COUNT: 13 % (ref 12.3–15.4)
GLOBULIN SER CALC-MCNC: 2.4 GM/DL
GLUCOSE SERPL-MCNC: 93 MG/DL (ref 65–99)
HCT VFR BLD AUTO: 41.1 % (ref 37.5–51)
HDLC SERPL-MCNC: 48 MG/DL (ref 40–60)
HGB BLD-MCNC: 13.6 G/DL (ref 13–17.7)
LDLC SERPL CALC-MCNC: 43 MG/DL (ref 0–100)
MCH RBC QN AUTO: 31.1 PG (ref 26.6–33)
MCHC RBC AUTO-ENTMCNC: 33.1 G/DL (ref 31.5–35.7)
MCV RBC AUTO: 94.1 FL (ref 79–97)
PLATELET # BLD AUTO: 324 10*3/MM3 (ref 140–450)
POTASSIUM SERPL-SCNC: 4.4 MMOL/L (ref 3.5–5.2)
PROT SERPL-MCNC: 6.7 G/DL (ref 6–8.5)
RBC # BLD AUTO: 4.37 10*6/MM3 (ref 4.14–5.8)
SODIUM SERPL-SCNC: 141 MMOL/L (ref 136–145)
TRIGL SERPL-MCNC: 73 MG/DL (ref 0–150)
VLDLC SERPL CALC-MCNC: 15 MG/DL (ref 5–40)
WBC # BLD AUTO: 7.67 10*3/MM3 (ref 3.4–10.8)

## 2025-08-25 ENCOUNTER — OFFICE VISIT (OUTPATIENT)
Dept: INTERNAL MEDICINE | Facility: CLINIC | Age: 70
End: 2025-08-25
Payer: MEDICARE

## 2025-08-25 VITALS
DIASTOLIC BLOOD PRESSURE: 80 MMHG | HEART RATE: 81 BPM | WEIGHT: 243 LBS | SYSTOLIC BLOOD PRESSURE: 122 MMHG | HEIGHT: 70 IN | OXYGEN SATURATION: 98 % | BODY MASS INDEX: 34.79 KG/M2 | TEMPERATURE: 97.7 F

## 2025-08-25 DIAGNOSIS — I10 PRIMARY HYPERTENSION: ICD-10-CM

## 2025-08-25 DIAGNOSIS — G47.33 OSA (OBSTRUCTIVE SLEEP APNEA): ICD-10-CM

## 2025-08-25 DIAGNOSIS — C61 PROSTATE CANCER MANAGED WITH ACTIVE SURVEILLANCE: ICD-10-CM

## 2025-08-25 DIAGNOSIS — Z00.00 MEDICARE ANNUAL WELLNESS VISIT, SUBSEQUENT: Primary | ICD-10-CM

## 2025-08-25 DIAGNOSIS — M79.641 BILATERAL HAND PAIN: ICD-10-CM

## 2025-08-25 DIAGNOSIS — E78.5 DYSLIPIDEMIA: ICD-10-CM

## 2025-08-25 DIAGNOSIS — N40.1 BENIGN PROSTATIC HYPERPLASIA WITH POST-VOID DRIBBLING: ICD-10-CM

## 2025-08-25 DIAGNOSIS — F32.A ANXIETY AND DEPRESSION: ICD-10-CM

## 2025-08-25 DIAGNOSIS — I48.0 PAROXYSMAL A-FIB: ICD-10-CM

## 2025-08-25 DIAGNOSIS — E29.1 HYPOGONADISM IN MALE: ICD-10-CM

## 2025-08-25 DIAGNOSIS — F51.01 PRIMARY INSOMNIA: ICD-10-CM

## 2025-08-25 DIAGNOSIS — F41.9 ANXIETY AND DEPRESSION: ICD-10-CM

## 2025-08-25 DIAGNOSIS — N39.43 BENIGN PROSTATIC HYPERPLASIA WITH POST-VOID DRIBBLING: ICD-10-CM

## 2025-08-25 DIAGNOSIS — M79.642 BILATERAL HAND PAIN: ICD-10-CM

## 2025-08-25 DIAGNOSIS — Z00.00 ANNUAL PHYSICAL EXAM: ICD-10-CM

## 2025-08-25 DIAGNOSIS — I63.432 CEREBROVASCULAR ACCIDENT (CVA) DUE TO EMBOLISM OF LEFT POSTERIOR CEREBRAL ARTERY: ICD-10-CM

## 2025-08-25 DIAGNOSIS — M19.90 ARTHRITIS: ICD-10-CM

## 2025-08-25 DIAGNOSIS — J30.89 ENVIRONMENTAL AND SEASONAL ALLERGIES: ICD-10-CM

## 2025-08-25 RX ORDER — AMLODIPINE BESYLATE 10 MG/1
10 TABLET ORAL DAILY
Qty: 90 TABLET | Refills: 3 | Status: SHIPPED | OUTPATIENT
Start: 2025-08-25

## 2025-08-25 RX ORDER — TAMSULOSIN HYDROCHLORIDE 0.4 MG/1
1 CAPSULE ORAL DAILY
Qty: 30 CAPSULE | Refills: 0 | Status: SHIPPED | OUTPATIENT
Start: 2025-08-25

## 2025-08-25 RX ORDER — AMLODIPINE BESYLATE 2.5 MG/1
2.5 TABLET ORAL DAILY
Qty: 90 TABLET | Refills: 3 | Status: SHIPPED | OUTPATIENT
Start: 2025-08-25

## 2025-08-26 DIAGNOSIS — J30.89 ENVIRONMENTAL AND SEASONAL ALLERGIES: ICD-10-CM

## 2025-08-26 RX ORDER — IPRATROPIUM BROMIDE 21 UG/1
SPRAY, METERED NASAL
Qty: 30 ML | Refills: 12 | Status: SHIPPED | OUTPATIENT
Start: 2025-08-26

## 2025-08-27 ENCOUNTER — OFFICE VISIT (OUTPATIENT)
Dept: UROLOGY | Facility: CLINIC | Age: 70
End: 2025-08-27
Payer: MEDICARE

## 2025-08-27 VITALS
HEIGHT: 70 IN | WEIGHT: 240.8 LBS | HEART RATE: 68 BPM | BODY MASS INDEX: 34.47 KG/M2 | OXYGEN SATURATION: 98 % | RESPIRATION RATE: 16 BRPM | TEMPERATURE: 97.1 F

## 2025-08-27 DIAGNOSIS — R39.9 LOWER URINARY TRACT SYMPTOMS (LUTS): ICD-10-CM

## 2025-08-27 DIAGNOSIS — C61 PROSTATE CANCER MANAGED WITH ACTIVE SURVEILLANCE: Primary | ICD-10-CM

## (undated) DEVICE — DISPOSABLE TOURNIQUET CUFF SINGLE BLADDER, SINGLE PORT AND QUICK CONNECT CONNECTOR: Brand: COLOR CUFF

## (undated) DEVICE — GLV SURG BIOGEL M LTX PF 8

## (undated) DEVICE — BNDG ELAS MATRX V/CLS 4IN 5YD LF

## (undated) DEVICE — BLD SCLPL BEAVR MINI STR 2BVL 180D LF

## (undated) DEVICE — SUT ETHLN 4/0 PS2 18IN 1667H

## (undated) DEVICE — ADHS SKIN PREMIERPRO EXOFIN TOPICAL HI/VISC .5ML

## (undated) DEVICE — DRSNG GZ PETROLTM XEROFORM CURAD 1X8IN STRL

## (undated) DEVICE — PK EXTRM UPPR 20

## (undated) DEVICE — PAD UNDERCAST WYTEX 4IN 4YD LF STRL

## (undated) DEVICE — SLV SCD CALF HEMOFORCE DVT THERP REPROC MD

## (undated) DEVICE — GLV SURG SENSICARE GREEN W/ALOE PF LF 8 STRL